# Patient Record
Sex: FEMALE | Race: WHITE | NOT HISPANIC OR LATINO | Employment: OTHER | URBAN - METROPOLITAN AREA
[De-identification: names, ages, dates, MRNs, and addresses within clinical notes are randomized per-mention and may not be internally consistent; named-entity substitution may affect disease eponyms.]

---

## 2017-01-17 ENCOUNTER — TELEPHONE (OUTPATIENT)
Dept: NEUROLOGY | Facility: MEDICAL CENTER | Age: 62
End: 2017-01-17

## 2017-01-17 DIAGNOSIS — R53.1 LEFT-SIDED WEAKNESS: ICD-10-CM

## 2017-01-17 DIAGNOSIS — G81.94 LEFT HEMIPARESIS (HCC): ICD-10-CM

## 2017-01-17 DIAGNOSIS — G40.909 CEREBRAL SEIZURE (HCC): ICD-10-CM

## 2017-01-17 NOTE — TELEPHONE ENCOUNTER
Pt's  was calling and needed clarification on his wife's medication. Spoke with pt's  and clarification was given. He is also asking for a referral for PT due to his wife's left sided weakness. [place] to see a PCP to get these referrals. Please advise. Thank you. VANDANA

## 2017-01-26 ENCOUNTER — TELEPHONE (OUTPATIENT)
Dept: NEUROLOGY | Facility: MEDICAL CENTER | Age: 62
End: 2017-01-26

## 2017-01-26 DIAGNOSIS — D32.9 MENINGIOMA (HCC): ICD-10-CM

## 2017-01-26 DIAGNOSIS — G40.909 SEIZURE CEREBRAL (HCC): ICD-10-CM

## 2017-01-26 RX ORDER — LAMOTRIGINE 100 MG/1
100 TABLET ORAL 3 TIMES DAILY
Qty: 90 TAB | Refills: 3 | Status: SHIPPED | OUTPATIENT
Start: 2017-01-26 | End: 2017-06-05 | Stop reason: SDUPTHER

## 2017-01-26 RX ORDER — BUTALBITAL, ACETAMINOPHEN AND CAFFEINE 300; 40; 50 MG/1; MG/1; MG/1
1 CAPSULE ORAL EVERY 4 HOURS PRN
Qty: 15 CAP | Refills: 0 | Status: SHIPPED | OUTPATIENT
Start: 2017-01-26 | End: 2017-02-21 | Stop reason: SDUPTHER

## 2017-01-26 NOTE — TELEPHONE ENCOUNTER
Pt's  is calling and stating that the pt is suffering from daily headaches. She is light sensitive and she is really dizzy with the headaches. She is not having any sound sensitivity or double vision. She has been taking Ibuprofen or Tylenol but they are no longer helping. She is calling to ask for a medication to help. She is taking Keppra 250 mg TID and Lamictal 100 mg BID. Please advise. Thank you. VANDANA

## 2017-01-26 NOTE — TELEPHONE ENCOUNTER
Up the Lamictal to 100mg TID   Fiorcet 15# maximum per month. YP      Called and spoke with pt's . All information/instructions were given. She verbally understood and will comply with all given. VANDANA

## 2017-02-09 ENCOUNTER — TELEPHONE (OUTPATIENT)
Dept: NEUROLOGY | Facility: MEDICAL CENTER | Age: 62
End: 2017-02-09

## 2017-02-09 NOTE — TELEPHONE ENCOUNTER
Pt's  is calling and had questions about the next time she will need to do another EEG. All questions and concerns were addressed. He also states since the increase in the Lamictal 100 mg TID she has not had to use the Fioricet as often. KA

## 2017-02-21 DIAGNOSIS — D32.9 MENINGIOMA (HCC): ICD-10-CM

## 2017-02-21 RX ORDER — BUTALBITAL, ACETAMINOPHEN AND CAFFEINE 300; 40; 50 MG/1; MG/1; MG/1
1 CAPSULE ORAL EVERY 4 HOURS PRN
Qty: 15 CAP | Refills: 0 | Status: SHIPPED | OUTPATIENT
Start: 2017-02-21 | End: 2018-01-10 | Stop reason: SDUPTHER

## 2017-02-21 NOTE — TELEPHONE ENCOUNTER
Pt's  is calling and asking for a refill for the Fioricet. Pt was able to tolerate the medication and it did help when she needed it. They are asking for a refill. Last filled 1/26/17 for # 15 with NO refills. Please advise. Thank you. VANDANA

## 2017-03-16 ENCOUNTER — OFFICE VISIT (OUTPATIENT)
Dept: NEUROLOGY | Facility: MEDICAL CENTER | Age: 62
End: 2017-03-16
Payer: COMMERCIAL

## 2017-03-16 VITALS
TEMPERATURE: 98.2 F | OXYGEN SATURATION: 98 % | BODY MASS INDEX: 22.88 KG/M2 | HEIGHT: 64 IN | RESPIRATION RATE: 16 BRPM | HEART RATE: 83 BPM | WEIGHT: 134 LBS | DIASTOLIC BLOOD PRESSURE: 56 MMHG | SYSTOLIC BLOOD PRESSURE: 100 MMHG

## 2017-03-16 DIAGNOSIS — D32.9 MENINGIOMA (HCC): ICD-10-CM

## 2017-03-16 DIAGNOSIS — E03.8 OTHER SPECIFIED HYPOTHYROIDISM: ICD-10-CM

## 2017-03-16 PROCEDURE — 99214 OFFICE O/P EST MOD 30 MIN: CPT | Performed by: PSYCHIATRY & NEUROLOGY

## 2017-03-16 NOTE — MR AVS SNAPSHOT
"        Nadia Srivastava   3/16/2017 10:40 AM   Office Visit   MRN: [id number]    Department:  [facility]   Dept Phone:  [phone]    Description:  Female : 1955   Provider:  Kenisha Martel M.D.           Reason for Visit     Follow-Up Meningioma      Allergies as of 3/16/2017     Allergen Noted Reactions    Norco [Hydrocodone-Acetaminophen] 10/16/2015   Rash, Itching    Pt stated she becomes itchy when taken    Pcn [Penicillins] 2015   Rash    As a child. Has not received pcn since.      You were diagnosed with     Meningioma (CMS-[facility])   [697048]       Other specified hypothyroidism   [0659125]         Vital Signs     Blood Pressure Pulse Temperature Respirations Height Weight    100/56 mmHg 83 36.8 °C (98.2 °F) 16 1.626 m (5' 4.02\") 60.782 kg (134 lb)    Body Mass Index Oxygen Saturation Smoking Status             22.99 kg/m2 98% Never Smoker          Basic Information     Date Of Birth Sex Race Ethnicity Preferred Language    1955 Female White Non- English      Your appointments     2017 10:20 AM   Follow Up Visit with Kenisha Maretl M.D.   [facility] (--)    [address], [address]  [facility] [address] [phone]           You will be receiving a confirmation call a few days before your appointment from our automated call confirmation system.              Problem List              ICD-10-CM Priority Class Noted - Resolved    Benign neoplasm of supratentorial region of brain (CMS-[facility]) D33.0 Medium  10/15/2015 - Present    Meningioma (CMS-[facility]) D32.9 High  10/15/2015 - Present    Vasospasm of cerebral artery I67.848 Medium  10/18/2015 - Present    Respiratory failure following trauma and surgery (CMS-[facility]) J95.822 Medium  10/18/2015 - Present    GERD (gastroesophageal reflux disease) K21.9 Low  10/18/2015 - Present    Hypothyroidism E03.9 Low  10/18/2015 - Present    Hyperlipidemia E78.5 Low  10/18/2015 - Present    Inadequate anticoagulation " Z51.81, Z79.01 Medium  10/18/2015 - Present    Hyperglycemia R73.9 Medium  10/22/2015 - Present    Fever R50.9 Medium  10/23/2015 - Present    Debility R53.81 Medium  10/29/2015 - Present    Hyponatremia E87.1 Medium  10/29/2015 - Present    Dysphagia R13.10 High  10/31/2015 - Present    Elevated liver enzymes R74.8 Medium  11/1/2015 - Present    Left hemiparesis (CMS-HCC) G81.94 High Acute 11/3/2015 - Present    CVA, old, dysphagia I69.391   11/11/2015 - Present    Diabetes type 2, controlled (CMS-HCC) E11.9   11/18/2015 - Present    Hypokalemia E87.6   11/18/2015 - Present    Azotemia R79.89   11/18/2015 - Present    Seizure cerebral I67.89   8/11/2016 - Present      Health Maintenance        Date Due Completion Dates    DIABETES MONOFILAMENT / LE EXAM 1955 ---    RETINAL SCREENING 2/15/1971 ---    FASTING LIPID PROFILE 2/15/1971 ---    URINE ACR / MICROALBUMIN 2/15/1971 ---    IMM DTaP/Tdap/Td Vaccine (1 - Tdap) 2/15/1972 ---    IMM PNEUMOCOCCAL 19-64 (ADULT) MEDIUM RISK SERIES (1 of 1 - PPSV23) 2/15/1972 ---    PAP SMEAR 2/15/1974 ---    MAMMOGRAM 2/15/1993 ---    COLONOSCOPY 2/15/2003 ---    IMM ZOSTER VACCINE 2/15/2013 ---    A1C SCREENING 5/5/2016 11/5/2015    IMM INFLUENZA (1) 9/1/2016 12/18/2015    SERUM CREATININE 12/21/2016 12/21/2015, 12/20/2015, 12/14/2015, 12/13/2015, 12/11/2015, 12/10/2015, 12/5/2015, 11/30/2015, 11/29/2015, 11/26/2015, 11/22/2015, 11/19/2015, 11/16/2015, 11/13/2015, 11/10/2015, 11/7/2015, 11/6/2015, 11/5/2015, 11/3/2015, 11/1/2015, 10/31/2015, 10/30/2015, 10/29/2015, 10/28/2015, 10/27/2015, 10/26/2015, 10/25/2015, 10/24/2015, 10/23/2015, 10/23/2015, 10/23/2015, 10/22/2015, 10/22/2015, 10/22/2015, 10/22/2015, 10/22/2015, 10/21/2015, 10/21/2015, 10/21/2015, 10/21/2015, 10/20/2015, 10/20/2015, 10/20/2015, 10/20/2015, 10/20/2015, 10/19/2015, 10/19/2015, 10/19/2015, 10/18/2015, 10/18/2015, 10/18/2015, 10/18/2015, 10/17/2015, 10/17/2015, 10/17/2015, 10/17/2015, 9/6/2015, 9/5/2015               Current Immunizations     Influenza Vaccine Quad Inj (Pf) 12/18/2015  4:00 PM      Below and/or attached are the medications your provider expects you to take. Review all of your home medications and newly ordered medications with your provider and/or pharmacist. Follow medication instructions as directed by your provider and/or pharmacist. Please keep your medication list with you and share with your provider. Update the information when medications are discontinued, doses are changed, or new medications (including over-the-counter products) are added; and carry medication information at all times in the event of emergency situations     Allergies:  NORCO - Rash,Itching     PCN - Rash               Medications  Valid as of: March 16, 2017 - 11:22 AM    Generic Name Brand Name Tablet Size Instructions for use    Acetaminophen (Tab) Acetaminophen 650 MG Take 650 mg by mouth every 6 hours as needed (for temp over 101 F or mild/moderate pain).        Alum Hydroxide-Mag Trisilicate (Chew Tab) Alum Hydroxide-Mag Trisilicate 80-14.2 MG Take  by mouth.        Butalbital-APAP-Caffeine (Cap) FIORICET -40 MG Take 1 Cap by mouth every four hours as needed for Headache.        Calcium Carbonate Antacid (Chew Tab) Calcium Carbonate Antacid 1000 MG Take 1 Tab by mouth 3 times a day as needed (heartburn).        Cholecalciferol (Cap) Cholecalciferol 2000 UNIT Take 2,000 Units by mouth every day.        Cholecalciferol (Cap) Vitamin D 2000 UNITS Take  by mouth.        Cyanocobalamin (Tab) cyanocobalamin 100 MCG Take 1 Tab by mouth every day.        Diphenhydramine-Zinc Acetate (Cream) BENADRYL ITCH 1-0.1 % Apply to neck and chest 2 times daily as needed for itching        Estradiol (Tab) ESTRACE 1 MG Take 1 Tab by mouth every day.        Ferrous Sulfate (Tab) ferrous sulfate 325 (65 FE) MG Take 1 Tab by mouth 2 times a day, with meals.        LamoTRIgine (Tab) LAMICTAL 100 MG Take 1 Tab by mouth 3 times a day.         LevETIRAcetam (Tab) KEPPRA 250 MG Take 1 Tab by mouth 3 times a day.        Levothyroxine Sodium (Tab) SYNTHROID 100 MCG Take 1 Tab by mouth Every morning on an empty stomach.        Levothyroxine Sodium (Tab) SYNTHROID 88 MCG Take 88 mcg by mouth Every morning on an empty stomach.        Lutein (Cap) Lutein 20 MG Take  by mouth.        Magnesium Hydroxide (Suspension) MILK OF MAGNESIA 400 MG/5ML Take 30 mL by mouth 1 time daily as needed (if no BM).        Magnesium Oxide (Tab) MAG- (241.3 MG) MG Take 1 Tab by mouth every day.        Norethindrone Acetate (Tab) AYGESTIN 5 MG Take 1 Tab by mouth every day.        Omeprazole (CAPSULE DELAYED RELEASE) PRILOSEC 40 MG Take 1 Cap by mouth every day.        Omeprazole (CAPSULE DELAYED RELEASE) PRILOSEC 20 MG Take 20 mg by mouth every day.        Phenyleph-Shark Beatrice Oil-MO-Pet (Ointment) FORMULATION R 0.25-3-14-71.9 % Insert 1 Application in rectum at bedtime as needed (pruritis/pain). Equivalent to Preparation H Ointment        Simvastatin (Tab) ZOCOR 40 MG Take 40 mg by mouth every evening.        Sodium Phosphates (Enema) fleet 7-19 GM/118ML Insert 133 mL in rectum 1 time daily as needed (if bisacodyl ineffective after one day or if dulcolax is not ordered).        Venlafaxine HCl (Tab) EFFEXOR 75 MG Take 75 mg by mouth 3 times a day.        Vitamin E (Cap) VITAMIN E 400 UNIT Take 1 Cap by mouth every day.        .                 Medicines prescribed today were sent to:     [address] [address]    Phone: [phone] Fax: [phone]    Open 24 Hours?: No      Medication refill instructions:       If your prescription bottle indicates you have medication refills left, it is not necessary to call your provider’s office. Please contact your pharmacy and they will refill your medication.    If your prescription bottle indicates you do not have any refills left, you may request refills at any time  through one of the following ways: The online [employer] system (except Urgent Care), by calling your provider’s office, or by asking your pharmacy to contact your provider’s office with a refill request. Medication refills are processed only during regular business hours and may not be available until the next business day. Your provider may request additional information or to have a follow-up visit with you prior to refilling your medication.   *Please Note: Medication refills are assigned a new Rx number when refilled electronically. Your pharmacy may indicate that no refills were authorized even though a new prescription for the same medication is available at the pharmacy. Please request the medicine by name with the pharmacy before contacting your provider for a refill.        Referral     A referral request has been sent to our patient care coordination department. Please allow 3-5 business days for us to process this request and contact you either by phone or mail. If you do not hear from us by the 5th business day, please call us at [phone].        Instructions      IMPRESSION:    1. 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 Dizziness spells since March 2016 (Keppra was tapered off this Feb)-- seizures are most likely ( abnormal EEG)--- Dizziness subsided after keppra initially but came back recently  3. Hx of hypothyroidism  4. Seizure and poor memory 2015  5. Spells of hot -cold feelings, spells of GI upset  6. Falls, Bad headache, Bad dizziness    PLAN/RECOMMENDATIONS:      5 days of video EEG-- we will not need sleep deprivation  Advise cut down on pain medicating    Advise cut down on pain medicine-- including tylenol-- the less rescue medicine, the less likely to develop rebound headache  The goal is to limit all rescue medicine to less than 2# per week. The first 2 weeks of medicine withdrawal would be  tough    ________________________________________________________________________    These dizziness spells are part of seizure spectrum-- not all seizures would shake or passing out     The recent EEG-- right side brain has active brain malfunction still      Call us in one month--- to discuss about the side effects and possible modification of seizure medication    The current problem is Nadia has poor tolerance of keppra--higher dose than 250mg TID made her quite sleepy    Continue keppra  With Lamictal 200mg AM 100mg HS-- patient has more headache and dizziness  Will reduce lamictal to 100mg two times per day  In one month, will reduce lamictal to 100mg AM--stay at 100mg AM    We could even consider 5 days inpatient video EEG to adjust seizure medication in hospital if we could not find the best combination of seizure medication in outpatient  ________________________________________________________________________      ________________________________________________________________________      FOOD    Foods that combat inflammation     Include plenty of these anti-inflammatory foods in your diet:     tomatoes     olive oil     green leafy vegetables, such as spinach, kale, and collards     nuts like almonds and walnuts     fatty fish like salmon, mackerel, tuna, and sardines     fruits such as strawberries, blueberries, cherries, and oranges      Foods that inflame     Try to avoid or limit these foods as much as possible:     refined carbohydrates, such as white bread and pastries     French fries and other fried foods     soda and other sugar-sweetened beverages     red meat (burgers, steaks) and processed meat (hot dogs, sausage)     margarine, shortening, and lard      [email or web address]    ________________________________________________________________________          SIGNATURE:  Kenisha Martel    CC:  CURRY RodriguezNShilpaP.    MRI of brain  before and after surgery      INTERPRETATION:  This EEG denotes focal cortical dysfunction and active focal seizure   disorder coming from the right hemisphere probabley temporal lobe.       ____________________________________     MD CONSTANZA WHITMORE / MELLISSA    DD:  10/13/2016 08:20:01  DT:  10/13/2016 09:45:59    The following are fine with me  ________________________________________________________________________    Fish Oil -- Omega 3 1000mg 3# daily  Daily Probiotic too  Vit D-3 2000 unit daily or above  ________________________________________________________________________      D#:  [id number]  Job#:  [id number]      EEG showed active epileptiform discharges from right hemisphere                Webcom Access Code: [email or web address]  Expires: 4/15/2017  9:55 AM    Webcom  A secure, online tool to manage your health information     [employer] Webcom® is a secure, online tool that connects you to your personalized health information from the privacy of your home -- day or night - making it very easy for you to manage your healthcare. Once the activation process is completed, you can even access your medical information using the Webcom kerry, which is available for free in the [employer] Kerry store or Google Play store.     Webcom provides the following levels of access (as shown below):   My Chart Features   [facility] Doctor [facility]  Specialists [facility]  Urgent  Care Non-[facility]  Primary Care  Doctor   Email your healthcare team securely and privately 24/7 X X X    Manage appointments: schedule your next appointment; view details of past/upcoming appointments X      Request prescription refills. X      View recent personal medical records, including lab and immunizations X X X X   View health record, including health history, allergies, medications X X X X   Read reports about your outpatient visits, procedures, consult and ER notes X X X X   See your discharge summary, which is a recap of your hospital  and/or ER visit that includes your diagnosis, lab results, and care plan. X X       How to register for [employer]:  1. Go to  [email or web address].  2. Click on the Sign Up Now box, which takes you to the New Member Sign Up page. You will need to provide the following information:  a. Enter your Font[employer] Access Code exactly as it appears at the top of this page. (You will not need to use this code after you’ve completed the sign-up process. If you do not sign up before the expiration date, you must request a new code.)   b. Enter your date of birth.   c. Enter your home email address.   d. Click Submit, and follow the next screen’s instructions.  3. Create a [employer] ID. This will be your Fontacto login ID and cannot be changed, so think of one that is secure and easy to remember.  4. Create a [employer] password. You can change your password at any time.  5. Enter your Password Reset Question and Answer. This can be used at a later time if you forget your password.   6. Enter your e-mail address. This allows you to receive e-mail notifications when new information is available in Fontacto.  7. Click Sign Up. You can now view your health information.    For assistance activating your Font[employer] account, call [phone]

## 2017-03-16 NOTE — PROGRESS NOTES
NEUROLOGY NOTE    Referring Physician  Brittney Wells      CHIEF COMPLAINT:    Dizziness spells went away with keppra 250mg tid but came back weeks ago  but patient remained sleepy now  The EEG done just days ago-- active seizure focus from the right still  Left hand muscle strength improved with keppra  July 2015 started having seizures, found to have brain tumor-- status post surgery oct 2015-- she ended in hospital for 2+ months  keppra was stopped this Feb, she started having dizziness spells since March  She then had extensive work up in ENT doctor  Chief Complaint   Patient presents with   • Follow-Up     Meningioma       PRESENT ILLNESS:   Dizziness spells went away with keppra 250mg tid but came back weeks ago  but patient remained sleepy now  Dizziness spells went away with keppra but patient remained sleepy now  The EEG done just days ago-- active seizure focus from the right still  Left hand muscle strength improved with keppra  July 2015 started having seizures, found to have brain tumor-- status post surgery oct 2015-- she ended in hospital for 2+ months  keppra was stopped this Feb, she started having dizziness spells since March  She then had extensive work up in ENT doctor    She could walk with cane-- today sitting on the wheelchair    Right eye retina -- injuried        PAST MEDICAL HISTORY:  Past Medical History   Diagnosis Date   • Seizure      last 9/4/15   • Memory difficulties      r/t tumor   • Dental disorder      grinds teeth, uses mouth guard at night   • High cholesterol    • Disorder of thyroid    • Cataract      Right eye removed. Has cataract on left eye   • Myopia of both eyes    • Bronchitis      hx    • Pneumonia 2006     pneumonia and whopping cough    • Diabetes      was on oral meds, stopped because FS are low   • Hiatus hernia syndrome    • Bell's palsy      Hx   • Anesthesia 2010     difficulty swallowing post neck surgery   • Snoring    • Stroke    • Urinary incontinence         PAST SURGICAL HISTORY:  Past Surgical History   Procedure Laterality Date   • Other  2014     Right eye cataract removel    • Livia by laparoscopy  2014   • Other neurological surg       fusion c spine   • Gyn surgery       ablation   • Colonoscopy     • Other orthopedic surgery Left 01/2015     rotator cuff    • Recovery  10/15/2015     Procedure: IR2-Cerebral angiogram w/poss embolization  -;  Surgeon: [facility];  Location: SURGERY [facility];  Service:    • Craniotomy tumor Right 10/16/2015     Procedure: CRANIOTOMY TUMOR for meningioma;  Surgeon: Carlton Faria M.D.;  Location: SURGERY [facility];  Service:    • Gastroscopy-endo  11/11/2015     Procedure: GASTROSCOPY-ENDO;  Surgeon: Charles Khan M.D.;  Location: ENDOSCOPY [facility];  Service:    • Peg placement  11/11/2015     Procedure: PEG PLACEMENT;  Surgeon: Charles Khan M.D.;  Location: ENDOSCOPY [facility];  Service:        FAMILY HISTORY:  No family history on file.    SOCIAL HISTORY:  Social History     Social History   • Marital Status:      Spouse Name: N/A   • Number of Children: N/A   • Years of Education: N/A     Occupational History   • Not on file.     Social History Main Topics   • Smoking status: Never Smoker    • Smokeless tobacco: Never Used   • Alcohol Use: No   • Drug Use: No   • Sexual Activity: Not on file     Other Topics Concern   • Not on file     Social History Narrative     ALLERGIES:  Allergies   Allergen Reactions   • Norco [Hydrocodone-Acetaminophen] Rash and Itching     Pt stated she becomes itchy when taken   • Pcn [Penicillins] Rash     As a child. Has not received pcn since.     TOBHX  History   Smoking status   • Never Smoker    Smokeless tobacco   • Never Used     ALCHX  History   Alcohol Use No     DRUGHX  History   Drug Use No           MEDICATIONS:  Current Outpatient Prescriptions   Medication   • acetaminophen/caffeine/butalbital 300-40-50 mg  (FIORICET) -40 MG Cap capsule   • lamotrigine (LAMICTAL) 100 MG Tab   • lamotrigine (LAMICTAL) 25 MG Tab   • levetiracetam (KEPPRA) 250 MG tablet   • levothyroxine (SYNTHROID) 88 MCG Tab   • simvastatin (ZOCOR) 40 MG Tab   • venlafaxine (EFFEXOR) 75 MG Tab   • omeprazole (PRILOSEC) 20 MG delayed-release capsule   • Cholecalciferol (VITAMIN D) 2000 UNITS Cap   • Lutein 20 MG Cap   • Alum Hydroxide-Mag Trisilicate 80-14.2 MG Chew Tab   • Acetaminophen 650 MG Tab   • FORMULATION R 0.25-3-14-71.9 % Ointment   • calcium carbonate 1000 MG Chew Tab   • diphenhydramine-ZnAcetate (BENADRYL ITCH) 1-0.1 % Cream   • estradiol (ESTRACE) 1 MG Tab   • cyanocobalamin 100 MCG tablet   • ferrous sulfate 325 (65 FE) MG tablet   • magnesium hydroxide (MILK OF MAGNESIA) 400 MG/5ML Suspension   • magnesium oxide (MAG-OX) 400 (241.3 MG) MG Tab tablet   • norethindrone (AYGESTIN) 5 MG tablet   • levothyroxine (SYNTHROID) 100 MCG Tab   • omeprazole (PRILOSEC) 40 MG delayed-release capsule   • vitamin e (VITAMIN E) 400 UNIT Cap   • Sodium Phosphates (FLEET) 7-19 GM/118ML Enema   • Cholecalciferol 2000 UNIT Cap     No current facility-administered medications for this visit.       REVIEW OF SYSTEM:    Constitutional: Denies fevers, Denies weight changes   Eyes: right eye retina bleeding  Ears/Nose/Throat/Mouth: Denies nasal congestion or sore throat   Cardiovascular: Denies chest pain or palpitations   Respiratory: Denies SOB.   Gastrointestinal/Hepatic: Denies abdominal pain, nausea, vomiting, diarrhea, constipation or GI bleeding   Genitourinary: Denies bladder dysfunction, dysuria or frequency   Musculoskeletal/Rheum: Denies joint pain and swelling   Skin/Breast: Denies rash, denies breast lumps or discharge   Neurological: meningioma, seizure, dizziness  Psychiatric: denies mood disorder   Endocrine: denies hx of diabetes or thyroid dysfunction   Heme/Oncology/Lymph Nodes: Denies enlarged lymph nodes, denies brusing or known  bleeding disorder   Allergic/Immunologic: Denies hx of allergies         PHYSICAL AND NEUROLOGICAL EXMAINATIONS:  VITAL SIGNS: There were no vitals taken for this visit.  CURRENT WEIGHT:   BMI: There is no weight on file to calculate BMI.  PREVIOUS WEIGHTS:  Wt Readings from Last 25 Encounters:   12/15/16 61.236 kg (135 lb)   12/14/16 58.968 kg (130 lb)   08/11/16 63.504 kg (140 lb)   12/20/15 63.821 kg (140 lb 11.2 oz)   11/09/15 62.551 kg (137 lb 14.4 oz)   11/01/15 76.3 kg (168 lb 3.4 oz)   09/04/15 69.854 kg (154 lb)       General appearance of patient: WDWN(+) NAD(+)    EYES  o Fundus : Papilledem(-) Exudates(-) Hemorrhage(-)  Nervous System  Orientation to time, place and person(+)  Memory normal(-) recall 3/3  Language: aphasia(-)  Knowledge: past(+) Current(+)  Attention(+)  Right eye blindess  Cranial Nerves  • Nerve 2: intact  • Nerve 3,4,6: intact  • Nerve 5 : intact  • Nerve 7: intact  • Nerve 8: intact  • Nerve 9 & 10: intact  • Nerve 11: intact  • Nerve 12: intact  Muscle Power and muscle tone: mild left hemiparesis  Sensory System: Pin sensation intact(+)  Reflexes: symmetric increased over left limbs  Cerebellar Function FNP normal   Gait : general weakness-- still able to walk with cane  Heart and Vascular  Peripheral Vasucular system : Edema (-) Swelling(-)  RHB, Breathing sound clear  abdomen bowel sound normoactive  Extremities freely moveable  Joints no contracture       NEUROIMAGING: I reviewed the MRI/CT of brain       Able to walk with cane  Tendency  Bad headache      IMPRESSION:    1. 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 Dizziness spells since March 2016 (Keppra was tapered off this Feb)-- seizures are most likely ( abnormal EEG)--- Dizziness subsided after keppra initially but came back recently  3. Hx of hypothyroidism  4. Seizure and poor memory 2015  5. Spells of hot -cold feelings, spells of GI upset  6.  Falls, Bad headache, Bad dizziness    PLAN/RECOMMENDATIONS:      5 days of video EEG-- we will not need sleep deprivation  Advise cut down on pain medicating    Advise cut down on pain medicine-- including tylenol-- the less rescue medicine, the less likely to develop rebound headache  The goal is to limit all rescue medicine to less than 2# per week. The first 2 weeks of medicine withdrawal would be tough    ________________________________________________________________________    These dizziness spells are part of seizure spectrum-- not all seizures would shake or passing out     The recent EEG-- right side brain has active brain malfunction still      Call us in one month--- to discuss about the side effects and possible modification of seizure medication    The current problem is Nadia has poor tolerance of keppra--higher dose than 250mg TID made her quite sleepy    Continue keppra  With Lamictal 200mg AM 100mg HS-- patient has more headache and dizziness  Will reduce lamictal to 100mg two times per day  In one month, will reduce lamictal to 100mg AM--stay at 100mg AM    We could even consider 5 days inpatient video EEG to adjust seizure medication in hospital if we could not find the best combination of seizure medication in outpatient  ________________________________________________________________________      ________________________________________________________________________      FOOD    Foods that combat inflammation     Include plenty of these anti-inflammatory foods in your diet:     tomatoes     olive oil     green leafy vegetables, such as spinach, kale, and collards     nuts like almonds and walnuts     fatty fish like salmon, mackerel, tuna, and sardines     fruits such as strawberries, blueberries, cherries, and oranges      Foods that inflame     Try to avoid or limit these foods as much as possible:     refined carbohydrates, such as white bread and pastries     French fries and other  fried foods     soda and other sugar-sweetened beverages     red meat (burgers, steaks) and processed meat (hot dogs, sausage)     margarine, shortening, and lard      [email or web address]/staying-[email or web address]/foods-that-fight-inflammation    ________________________________________________________________________          SIGNATURE:  Kenisha Martel    CC:  JIM Rodriguez    MRI of brain before and after surgery      INTERPRETATION:  This EEG denotes focal cortical dysfunction and active focal seizure   disorder coming from the right hemisphere probabley temporal lobe.       ____________________________________     MD CONSTANZA WHITMORE / MELLISSA    DD:  10/13/2016 08:20:01  DT:  10/13/2016 09:45:59    The following are fine with me  ________________________________________________________________________    Fish Oil -- Omega 3 1000mg 3# daily  Daily Probiotic too  Vit D-3 2000 unit daily or above  ________________________________________________________________________      D#:  [id number]  Job#:  [id number]      EEG showed active epileptiform discharges from right hemisphere

## 2017-03-16 NOTE — PATIENT INSTRUCTIONS
IMPRESSION:    1. 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 Dizziness spells since March 2016 (Keppra was tapered off this Feb)-- seizures are most likely ( abnormal EEG)--- Dizziness subsided after keppra initially but came back recently  3. Hx of hypothyroidism  4. Seizure and poor memory 2015  5. Spells of hot -cold feelings, spells of GI upset  6. Falls, Bad headache, Bad dizziness    PLAN/RECOMMENDATIONS:      5 days of video EEG-- we will not need sleep deprivation  Advise cut down on pain medicating    Advise cut down on pain medicine-- including tylenol-- the less rescue medicine, the less likely to develop rebound headache  The goal is to limit all rescue medicine to less than 2# per week. The first 2 weeks of medicine withdrawal would be tough    ________________________________________________________________________    These dizziness spells are part of seizure spectrum-- not all seizures would shake or passing out     The recent EEG-- right side brain has active brain malfunction still      Call us in one month--- to discuss about the side effects and possible modification of seizure medication    The current problem is Nadia has poor tolerance of keppra--higher dose than 250mg TID made her quite sleepy    Continue keppra  With Lamictal 200mg AM 100mg HS-- patient has more headache and dizziness  Will reduce lamictal to 100mg two times per day  In one month, will reduce lamictal to 100mg AM--stay at 100mg AM    We could even consider 5 days inpatient video EEG to adjust seizure medication in hospital if we could not find the best combination of seizure medication in outpatient  ________________________________________________________________________      ________________________________________________________________________      FOOD    Foods that combat inflammation     Include plenty of these anti-inflammatory foods in  your diet:     tomatoes     olive oil     green leafy vegetables, such as spinach, kale, and collards     nuts like almonds and walnuts     fatty fish like salmon, mackerel, tuna, and sardines     fruits such as strawberries, blueberries, cherries, and oranges      Foods that inflame     Try to avoid or limit these foods as much as possible:     refined carbohydrates, such as white bread and pastries     French fries and other fried foods     soda and other sugar-sweetened beverages     red meat (burgers, steaks) and processed meat (hot dogs, sausage)     margarine, shortening, and lard      [email or web address]/staying-healthy/foods-that-fight-inflammation    ________________________________________________________________________          SIGNATURE:  Kenisha Martel    CC:  JIM Rodriguez    MRI of brain before and after surgery      INTERPRETATION:  This EEG denotes focal cortical dysfunction and active focal seizure   disorder coming from the right hemisphere probabley temporal lobe.       ____________________________________     MD CONSTANZA WHITMORE / MELLISSA    DD:  10/13/2016 08:20:01  DT:  10/13/2016 09:45:59    The following are fine with me  ________________________________________________________________________    Fish Oil -- Omega 3 1000mg 3# daily  Daily Probiotic too  Vit D-3 2000 unit daily or above  ________________________________________________________________________      D#:  [id number]  Job#:  [id number]      EEG showed active epileptiform discharges from right hemisphere

## 2017-03-31 ENCOUNTER — TELEPHONE (OUTPATIENT)
Dept: NEUROLOGY | Facility: MEDICAL CENTER | Age: 62
End: 2017-03-31

## 2017-03-31 NOTE — TELEPHONE ENCOUNTER
YES, of course-- try Lamictal go back to 100 mg  mg HS   And not change of Lamictal till next visit       LAST TIME-- the caregivers told me that   ________________________________________________________________________     With Lamictal 200 mg  mg HS-- patient has more headache and dizziness   Will reduce Lamictal to 100 mg two times per day and ..... YP  ----------------------------------------------------------------------------------------------------------------------------------      Called and spoke with pt's . All information/instructions were given. He verbally understood and will comply with all given. VANDANA

## 2017-03-31 NOTE — TELEPHONE ENCOUNTER
Pt's  is calling and stating since 3/16/17 when the Lamictal was decreased to 100 mg BID from 200 mg in the am and 100 in the pm she is having to use the Fioricet more often. He is calling and asking should she go back up on the dose of the Lamictal. Please advise. Thank you. VANDANA

## 2017-06-05 DIAGNOSIS — G40.909 SEIZURE CEREBRAL (HCC): ICD-10-CM

## 2017-06-07 RX ORDER — LAMOTRIGINE 100 MG/1
TABLET ORAL
Qty: 90 TAB | Refills: 5 | Status: SHIPPED | OUTPATIENT
Start: 2017-06-07 | End: 2017-11-02 | Stop reason: SDUPTHER

## 2017-07-18 ENCOUNTER — OFFICE VISIT (OUTPATIENT)
Dept: NEUROLOGY | Facility: MEDICAL CENTER | Age: 62
End: 2017-07-18
Payer: COMMERCIAL

## 2017-07-18 VITALS
WEIGHT: 143.2 LBS | BODY MASS INDEX: 24.45 KG/M2 | OXYGEN SATURATION: 97 % | HEIGHT: 64 IN | TEMPERATURE: 97.6 F | DIASTOLIC BLOOD PRESSURE: 76 MMHG | HEART RATE: 77 BPM | SYSTOLIC BLOOD PRESSURE: 122 MMHG | RESPIRATION RATE: 17 BRPM

## 2017-07-18 DIAGNOSIS — G40.909 SEIZURE CEREBRAL (HCC): ICD-10-CM

## 2017-07-18 DIAGNOSIS — D32.9 MENINGIOMA (HCC): ICD-10-CM

## 2017-07-18 PROCEDURE — 99214 OFFICE O/P EST MOD 30 MIN: CPT | Performed by: PSYCHIATRY & NEUROLOGY

## 2017-07-18 RX ORDER — TOPIRAMATE 50 MG/1
50 TABLET, FILM COATED ORAL 2 TIMES DAILY
Qty: 60 TAB | Refills: 3 | Status: SHIPPED | OUTPATIENT
Start: 2017-07-18 | End: 2017-08-03

## 2017-07-18 ASSESSMENT — PATIENT HEALTH QUESTIONNAIRE - PHQ9
5. POOR APPETITE OR OVEREATING: 0 - NOT AT ALL
CLINICAL INTERPRETATION OF PHQ2 SCORE: 1
SUM OF ALL RESPONSES TO PHQ QUESTIONS 1-9: 10

## 2017-07-18 ASSESSMENT — PAIN SCALES - GENERAL: PAINLEVEL: 8=MODERATE-SEVERE PAIN

## 2017-07-18 NOTE — PATIENT INSTRUCTIONS
IMPRESSION:    1. 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 Dizziness spells since March 2016 (Keppra was tapered off this Feb)-- seizures are most likely ( abnormal EEG)--- Dizziness subsided after keppra initially but came back recently  3. Hx of hypothyroidism  4. Seizure and poor memory 2015  5. Spells of hot -cold feelings, spells of GI upset  6. Falls, Bad headache, Bad dizziness    PLAN/RECOMMENDATIONS:      The major concerns--- headache daily, no energy, tiredness, hallucinations    Advise cut down on pain medicine-- including tylenol-- the less rescue medicine, the less likely to develop rebound headache  The goal is to limit all rescue medicine to less than 2# per week. The first 2 weeks of medicine withdrawal would be tough    ________________________________________________________________________    These dizziness spells are part of seizure spectrum-- not all seizures would shake or passing out     The recent EEG-- right side brain has active brain malfunction still      Nadia has poor tolerance of keppra--higher dose than 250mg TID made her quite sleepy    Continue keppra     With Lamictal 200mg AM 100mg HS-- patient has more headache and dizziness ( reduction of Lamictal made things even worse)    This time, we will add Topamax 50mg before sleep for one week, then up Topamax to 50mg two times per day since the second week      We could even consider 5 days inpatient video EEG to adjust seizure medication in hospital if we could not find the best combination of seizure medication in outpatient  ________________________________________________________________________      ________________________________________________________________________    Fish Oil -- Omega 3 1000mg 3#-6# daily ( Flaxseed oil is fine too)  Try Daily Probiotic too  Vit D-3 4000 unit  daily  ________________________________________________________________________        ________________________________________________________________________      FOOD    Foods that combat inflammation     Include plenty of these anti-inflammatory foods in your diet:     tomatoes     olive oil     green leafy vegetables, such as spinach, kale, and collards     nuts like almonds and walnuts     fatty fish like salmon, mackerel, tuna, and sardines     fruits such as strawberries, blueberries, cherries, and oranges      Foods that inflame     Try to avoid or limit these foods as much as possible:     refined carbohydrates, such as white bread and pastries     French fries and other fried foods     soda and other sugar-sweetened beverages     red meat (burgers, steaks) and processed meat (hot dogs, sausage)     margarine, shortening, and lard      [email or web address]/staying-[email or web address]/foods-that-fight-inflammation    ________________________________________________________________________

## 2017-07-18 NOTE — PROGRESS NOTES
NEUROLOGY NOTE    Referring Physician  Brittney Wells      CHIEF COMPLAINT:    Dizziness spells went away with keppra 250mg tid but came back weeks ago  but patient remained sleepy now  The EEG done just days ago-- active seizure focus from the right still  Left hand muscle strength improved with keppra  July 2015 started having seizures, found to have brain tumor-- status post surgery oct 2015-- she ended in hospital for 2+ months  keppra was stopped this Feb, she started having dizziness spells since March  She then had extensive work up in ENT doctor  Chief Complaint   Patient presents with   • Follow-Up     4m [facility]       PRESENT ILLNESS:   Dizziness spells went away with keppra 250mg tid but came back weeks ago  but patient remained sleepy now  Dizziness spells went away with keppra but patient remained sleepy now  The EEG done just days ago-- active seizure focus from the right still  Left hand muscle strength improved with keppra  July 2015 started having seizures, found to have brain tumor-- status post surgery oct 2015-- she ended in hospital for 2+ months  keppra was stopped this Feb, she started having dizziness spells since March  She then had extensive work up in ENT doctor    She could walk with cane-- today sitting on the wheelchair    Right eye retina -- injuried        PAST MEDICAL HISTORY:  Past Medical History   Diagnosis Date   • Seizure      last 9/4/15   • Memory difficulties      r/t tumor   • Dental disorder      grinds teeth, uses mouth guard at night   • High cholesterol    • Disorder of thyroid    • Cataract      Right eye removed. Has cataract on left eye   • Myopia of both eyes    • Bronchitis      hx    • Pneumonia 2006     pneumonia and whopping cough    • Diabetes      was on oral meds, stopped because FS are low   • Hiatus hernia syndrome    • Bell's palsy      Hx   • Anesthesia 2010     difficulty swallowing post neck surgery   • Snoring    • Stroke    • Urinary incontinence             PAST SURGICAL HISTORY:  Past Surgical History   Procedure Laterality Date   • Other  2014     Right eye cataract removel    • Livia by laparoscopy  2014   • Other neurological surg       fusion c spine   • Gyn surgery       ablation   • Colonoscopy     • Other orthopedic surgery Left 01/2015     rotator cuff    • Recovery  10/15/2015     Procedure: IR2-Cerebral angiogram w/poss embolization  ;  Surgeon: [facility];  Location: SURGERY [facility];  Service:    • Craniotomy tumor Right 10/16/2015     Procedure: CRANIOTOMY TUMOR for meningioma;  Surgeon: Carlton Faria M.D.;  Location: SURGERY [facility];  Service:    • Gastroscopy-endo  11/11/2015     Procedure: GASTROSCOPY-ENDO;  Surgeon: Charles Khan M.D.;  Location: ENDOSCOPY [facility];  Service:    • Peg placement  11/11/2015     Procedure: PEG PLACEMENT;  Surgeon: Charles Khan M.D.;  Location: ENDOSCOPY [facility];  Service:        FAMILY HISTORY:  No family history on file.    SOCIAL HISTORY:  Social History     Social History   • Marital Status:      Spouse Name: N/A   • Number of Children: N/A   • Years of Education: N/A     Occupational History   • Not on file.     Social History Main Topics   • Smoking status: Never Smoker    • Smokeless tobacco: Never Used   • Alcohol Use: No   • Drug Use: No   • Sexual Activity: Not on file     Other Topics Concern   • Not on file     Social History Narrative     ALLERGIES:  Allergies   Allergen Reactions   • Norco [Hydrocodone-Acetaminophen] Rash and Itching     Pt stated she becomes itchy when taken   • Pcn [Penicillins] Rash     As a child. Has not received pcn since.     TOBHX  History   Smoking status   • Never Smoker    Smokeless tobacco   • Never Used     ALCHX  History   Alcohol Use No     DRUGHX  History   Drug Use No           MEDICATIONS:  Current Outpatient Prescriptions   Medication   • lamotrigine (LAMICTAL) 100 MG Tab   •  acetaminophen/caffeine/butalbital 300-40-50 mg (FIORICET) -40 MG Cap capsule   • levetiracetam (KEPPRA) 250 MG tablet   • levothyroxine (SYNTHROID) 88 MCG Tab   • simvastatin (ZOCOR) 40 MG Tab   • venlafaxine (EFFEXOR) 75 MG Tab   • omeprazole (PRILOSEC) 20 MG delayed-release capsule   • Cholecalciferol (VITAMIN D) 2000 UNITS Cap   • Lutein 20 MG Cap   • Alum Hydroxide-Mag Trisilicate 80-14.2 MG Chew Tab   • Acetaminophen 650 MG Tab   • FORMULATION R 0.25-3-14-71.9 % Ointment   • calcium carbonate 1000 MG Chew Tab   • diphenhydramine-ZnAcetate (BENADRYL ITCH) 1-0.1 % Cream   • estradiol (ESTRACE) 1 MG Tab   • cyanocobalamin 100 MCG tablet   • ferrous sulfate 325 (65 FE) MG tablet   • magnesium hydroxide (MILK OF MAGNESIA) 400 MG/5ML Suspension   • magnesium oxide (MAG-OX) 400 (241.3 MG) MG Tab tablet   • norethindrone (AYGESTIN) 5 MG tablet   • levothyroxine (SYNTHROID) 100 MCG Tab   • omeprazole (PRILOSEC) 40 MG delayed-release capsule   • vitamin e (VITAMIN E) 400 UNIT Cap   • Sodium Phosphates (FLEET) 7-19 GM/118ML Enema   • Cholecalciferol 2000 UNIT Cap     No current facility-administered medications for this visit.       REVIEW OF SYSTEM:    Constitutional: Denies fevers, Denies weight changes   Eyes: right eye retina bleeding  Ears/Nose/Throat/Mouth: Denies nasal congestion or sore throat   Cardiovascular: Denies chest pain or palpitations   Respiratory: Denies SOB.   Gastrointestinal/Hepatic: Denies abdominal pain, nausea, vomiting, diarrhea, constipation or GI bleeding   Genitourinary: Denies bladder dysfunction, dysuria or frequency   Musculoskeletal/Rheum: Denies joint pain and swelling   Skin/Breast: Denies rash, denies breast lumps or discharge   Neurological: meningioma, seizure, dizziness  Psychiatric: denies mood disorder   Endocrine: denies hx of diabetes or thyroid dysfunction   Heme/Oncology/Lymph Nodes: Denies enlarged lymph nodes, denies brusing or known bleeding disorder    "  Allergic/Immunologic: Denies hx of allergies         PHYSICAL AND NEUROLOGICAL EXMAINATIONS:  VITAL SIGNS: /76 mmHg  Pulse 77  Temp(Src) 36.4 °C (97.6 °F)  Resp 17  Ht 1.626 m (5' 4\")  Wt 64.955 kg (143 lb 3.2 oz)  BMI 24.57 kg/m2  SpO2 97%  CURRENT WEIGHT:   BMI: Body mass index is 24.57 kg/(m^2).  PREVIOUS WEIGHTS:  Wt Readings from Last 25 Encounters:   07/18/17 64.955 kg (143 lb 3.2 oz)   03/16/17 60.782 kg (134 lb)   12/15/16 61.236 kg (135 lb)   12/14/16 58.968 kg (130 lb)   08/11/16 63.504 kg (140 lb)   12/20/15 63.821 kg (140 lb 11.2 oz)   11/09/15 62.551 kg (137 lb 14.4 oz)   11/01/15 76.3 kg (168 lb 3.4 oz)   09/04/15 69.854 kg (154 lb)       General appearance of patient: WDWN(+) NAD(+)    EYES  o Fundus : Papilledem(-) Exudates(-) Hemorrhage(-)  Nervous System  Orientation to time, place and person(+)  Memory normal(-) recall 3/3  Language: aphasia(-)  Knowledge: past(+) Current(+)  Attention(+)  Right eye blindess  Cranial Nerves  • Nerve 2: intact  • Nerve 3,4,6: intact  • Nerve 5 : intact  • Nerve 7: intact  • Nerve 8: intact  • Nerve 9 & 10: intact  • Nerve 11: intact  • Nerve 12: intact  Muscle Power and muscle tone: mild left hemiparesis  Sensory System: Pin sensation intact(+)  Reflexes: symmetric increased over left limbs  Cerebellar Function FNP normal   Gait : general weakness-- still able to walk with cane  Heart and Vascular  Peripheral Vasucular system : Edema (-) Swelling(-)  RHB, Breathing sound clear  abdomen bowel sound normoactive  Extremities freely moveable  Joints no contracture       NEUROIMAGING: I reviewed the MRI/CT of brain       Able to walk with cane  Tendency  Bad headache      IMPRESSION:    1. 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 Dizziness spells since March 2016 (Keppra was tapered off this Feb)-- seizures are most likely ( abnormal EEG)--- Dizziness subsided after keppra " initially but came back recently  3. Hx of hypothyroidism  4. Seizure and poor memory 2015  5. Spells of hot -cold feelings, spells of GI upset  6. Falls, Bad headache, Bad dizziness    PLAN/RECOMMENDATIONS:      The major concerns--- headache daily, no energy, tiredness, hallucinations    Advise cut down on pain medicine-- including tylenol-- the less rescue medicine, the less likely to develop rebound headache  The goal is to limit all rescue medicine to less than 2# per week. The first 2 weeks of medicine withdrawal would be tough    ________________________________________________________________________    These dizziness spells are part of seizure spectrum-- not all seizures would shake or passing out     The recent EEG-- right side brain has active brain malfunction still      Nadia has poor tolerance of keppra--higher dose than 250mg TID made her quite sleepy    Continue keppra     With Lamictal 200mg AM 100mg HS-- patient has more headache and dizziness ( reduction of Lamictal made things even worse)    This time, we will add Topamax 50mg before sleep for one week, then up Topamax to 50mg two times per day since the second week      We could even consider 5 days inpatient video EEG to adjust seizure medication in hospital if we could not find the best combination of seizure medication in outpatient  ________________________________________________________________________      ________________________________________________________________________    Fish Oil -- Omega 3 1000mg 3#-6# daily ( Flaxseed oil is fine too)  Try Daily Probiotic too  Vit D-3 4000 unit daily  ________________________________________________________________________        ________________________________________________________________________      FOOD    Foods that combat inflammation     Include plenty of these anti-inflammatory foods in your diet:     tomatoes     olive oil     green leafy vegetables, such as spinach, kale, and  collards     nuts like almonds and walnuts     fatty fish like salmon, mackerel, tuna, and sardines     fruits such as strawberries, blueberries, cherries, and oranges      Foods that inflame     Try to avoid or limit these foods as much as possible:     refined carbohydrates, such as white bread and pastries     French fries and other fried foods     soda and other sugar-sweetened beverages     red meat (burgers, steaks) and processed meat (hot dogs, sausage)     margarine, shortening, and lard      [email or web address]    ________________________________________________________________________          SIGNATURE:  Kenisha Martel    CC:  CURRY [email or web address].    MRI of brain before and after surgery      INTERPRETATION:  This EEG denotes focal cortical dysfunction and active focal seizure   disorder coming from the right hemisphere probabley temporal lobe.       ____________________________________     MD CONSTANZA WHITMORE / MELLISSA    DD:  10/13/2016 08:20:01  DT:  10/13/2016 09:45:59      D#:  [id number]  Job#:  [id number]      EEG showed active epileptiform discharges from right hemisphere

## 2017-07-18 NOTE — MR AVS SNAPSHOT
"        Nadia Srivastava   2017 10:20 AM   Office Visit   MRN: [id number]    Department:  [facility]   Dept Phone:  [phone]    Description:  Female : 1955   Provider:  Kenisha Martel M.D.           Reason for Visit     Follow-Up 4m FV      Allergies as of 2017     Allergen Noted Reactions    Norco [Hydrocodone-Acetaminophen] 10/16/2015   Rash, Itching    Pt stated she becomes itchy when taken    Banana 2017       Pcn [Penicillins] 2015   Rash    As a child. Has not received pcn since.      You were diagnosed with     Meningioma (CMS-HCC)   [357140]       Seizure cerebral   [357093]         Vital Signs     Blood Pressure Pulse Temperature Respirations Height Weight    122/76 mmHg 77 36.4 °C (97.6 °F) 17 1.626 m (5' 4\") 64.955 kg (143 lb 3.2 oz)    Body Mass Index Oxygen Saturation Smoking Status             24.57 kg/m2 97% Never Smoker          Basic Information     Date Of Birth Sex Race Ethnicity Preferred Language    1955 Female White Non- English      Your appointments     2017  9:40 AM   Follow Up Visit with Kenisha Martel M.D.   [facility] (--)    [address] [place] [address], [address]  [facility] [address] [phone]           You will be receiving a confirmation call a few days before your appointment from our automated call confirmation system.              Problem List              ICD-10-CM Priority Class Noted - Resolved    Benign neoplasm of supratentorial region of brain (CMS-[facility]) D33.0 Medium  10/15/2015 - Present    Meningioma (CMS-HCC) D32.9 High  10/15/2015 - Present    Vasospasm of cerebral artery I67.848 Medium  10/18/2015 - Present    Respiratory failure following trauma and surgery (CMS-[facility]) J95.822 Medium  10/18/2015 - Present    GERD (gastroesophageal reflux disease) K21.9 Low  10/18/2015 - Present    Hypothyroidism E03.9 Low  10/18/2015 - Present    Hyperlipidemia E78.5 Low  10/18/2015 - Present    Inadequate " anticoagulation Z51.81, Z79.01 Medium  10/18/2015 - Present    Hyperglycemia R73.9 Medium  10/22/2015 - Present    Fever R50.9 Medium  10/23/2015 - Present    Debility R53.81 Medium  10/29/2015 - Present    Hyponatremia E87.1 Medium  10/29/2015 - Present    Dysphagia R13.10 High  10/31/2015 - Present    Elevated liver enzymes R74.8 Medium  11/1/2015 - Present    Left hemiparesis (CMS-HCC) G81.94 High Acute 11/3/2015 - Present    CVA, old, dysphagia I69.391   11/11/2015 - Present    Diabetes type 2, controlled (CMS-formerly [facility]) E11.9   11/18/2015 - Present    Hypokalemia E87.6   11/18/2015 - Present    Azotemia R79.89   11/18/2015 - Present    Seizure cerebral I67.89   8/11/2016 - Present      Health Maintenance        Date Due Completion Dates    DIABETES MONOFILAMENT / LE EXAM 1955 ---    RETINAL SCREENING 2/15/1971 ---    FASTING LIPID PROFILE 2/15/1971 ---    URINE ACR / MICROALBUMIN 2/15/1971 ---    IMM DTaP/Tdap/Td Vaccine (1 - Tdap) 2/15/1972 ---    IMM PNEUMOCOCCAL 19-64 (ADULT) MEDIUM RISK SERIES (1 of 1 - PPSV23) 2/15/1972 ---    PAP SMEAR 2/15/1974 ---    MAMMOGRAM 2/15/1993 ---    COLONOSCOPY 2/15/2003 ---    IMM ZOSTER VACCINE 2/15/2013 ---    A1C SCREENING 5/5/2016 11/5/2015    SERUM CREATININE 12/21/2016 12/21/2015, 12/20/2015, 12/14/2015, 12/13/2015, 12/11/2015, 12/10/2015, 12/5/2015, 11/30/2015, 11/29/2015, 11/26/2015, 11/22/2015, 11/19/2015, 11/16/2015, 11/13/2015, 11/10/2015, 11/7/2015, 11/6/2015, 11/5/2015, 11/3/2015, 11/1/2015, 10/31/2015, 10/30/2015, 10/29/2015, 10/28/2015, 10/27/2015, 10/26/2015, 10/25/2015, 10/24/2015, 10/23/2015, 10/23/2015, 10/23/2015, 10/22/2015, 10/22/2015, 10/22/2015, 10/22/2015, 10/22/2015, 10/21/2015, 10/21/2015, 10/21/2015, 10/21/2015, 10/20/2015, 10/20/2015, 10/20/2015, 10/20/2015, 10/20/2015, 10/19/2015, 10/19/2015, 10/19/2015, 10/18/2015, 10/18/2015, 10/18/2015, 10/18/2015, 10/17/2015, 10/17/2015, 10/17/2015, 10/17/2015, 9/6/2015, 9/5/2015    IMM INFLUENZA (1) 9/1/2017  12/18/2015            Current Immunizations     Influenza Vaccine Quad Inj (Pf) 12/18/2015  4:00 PM      Below and/or attached are the medications your provider expects you to take. Review all of your home medications and newly ordered medications with your provider and/or pharmacist. Follow medication instructions as directed by your provider and/or pharmacist. Please keep your medication list with you and share with your provider. Update the information when medications are discontinued, doses are changed, or new medications (including over-the-counter products) are added; and carry medication information at all times in the event of emergency situations     Allergies:  NORCO - Rash,Itching     BANANA - (reactions not documented)     PCN - Rash               Medications  Valid as of: July 18, 2017 - 11:05 AM    Generic Name Brand Name Tablet Size Instructions for use    Acetaminophen (Tab) Acetaminophen 650 MG Take 650 mg by mouth every 6 hours as needed (for temp over 101 F or mild/moderate pain).        Alum Hydroxide-Mag Trisilicate (Chew Tab) Alum Hydroxide-Mag Trisilicate 80-14.2 MG Take  by mouth 2 Times a Day.        Butalbital-APAP-Caffeine (Cap) FIORICET -40 MG Take 1 Cap by mouth every four hours as needed for Headache.        Calcium Carbonate Antacid (Chew Tab) Calcium Carbonate Antacid 1000 MG Take 1 Tab by mouth 3 times a day as needed (heartburn).        Cholecalciferol (Cap) Cholecalciferol 2000 UNIT Take 2,000 Units by mouth every day.        Cholecalciferol (Cap) Vitamin D 2000 UNITS Take  by mouth.        Cyanocobalamin (Tab) cyanocobalamin 100 MCG Take 1 Tab by mouth every day.        Diphenhydramine-Zinc Acetate (Cream) BENADRYL ITCH 1-0.1 % Apply to neck and chest 2 times daily as needed for itching        Estradiol (Tab) ESTRACE 1 MG Take 1 Tab by mouth every day.        Ferrous Sulfate (Tab) ferrous sulfate 325 (65 FE) MG Take 1 Tab by mouth 2 times a day, with meals.         LamoTRIgine (Tab) LAMICTAL 100 MG TAKE ONE TABLET ORALLY THREE TIMES DAILY.        LevETIRAcetam (Tab) KEPPRA 250 MG Take 1 Tab by mouth 3 times a day.        Levothyroxine Sodium (Tab) SYNTHROID 100 MCG Take 1 Tab by mouth Every morning on an empty stomach.        Levothyroxine Sodium (Tab) SYNTHROID 88 MCG Take 88 mcg by mouth Every morning on an empty stomach.        Lutein (Cap) Lutein 20 MG Take  by mouth.        Magnesium Hydroxide (Suspension) MILK OF MAGNESIA 400 MG/5ML Take 30 mL by mouth 1 time daily as needed (if no BM).        Magnesium Oxide (Tab) MAG- (241.3 MG) MG Take 1 Tab by mouth every day.        Norethindrone Acetate (Tab) AYGESTIN 5 MG Take 1 Tab by mouth every day.        Omeprazole (CAPSULE DELAYED RELEASE) PRILOSEC 40 MG Take 1 Cap by mouth every day.        Omeprazole (CAPSULE DELAYED RELEASE) PRILOSEC 20 MG Take 20 mg by mouth every day.        Phenyleph-Shark Beatrice Oil-MO-Pet (Ointment) FORMULATION R 0.25-3-14-71.9 % Insert 1 Application in rectum at bedtime as needed (pruritis/pain). Equivalent to Preparation H Ointment        Simvastatin (Tab) ZOCOR 40 MG Take 40 mg by mouth every evening.        Sodium Phosphates (Enema) fleet 7-19 GM/118ML Insert 133 mL in rectum 1 time daily as needed (if bisacodyl ineffective after one day or if dulcolax is not ordered).        Topiramate (Tab) TOPAMAX 50 MG Take 1 Tab by mouth 2 times a day.        Venlafaxine HCl (Tab) EFFEXOR 75 MG Take 75 mg by mouth 3 times a day.        Vitamin E (Cap) VITAMIN E 400 UNIT Take 1 Cap by mouth every day.        .                 Medicines prescribed today were sent to:     [facility] [address] [place] [address] [facility] [address]    Phone: [phone] Fax: [phone]    Open 24 Hours?: No      Medication refill instructions:       If your prescription bottle indicates you have medication refills left, it is not necessary to call your provider’s office. Please contact your  pharmacy and they will refill your medication.    If your prescription bottle indicates you do not have any refills left, you may request refills at any time through one of the following ways: The online [employer] system (except Urgent Care), by calling your provider’s office, or by asking your pharmacy to contact your provider’s office with a refill request. Medication refills are processed only during regular business hours and may not be available until the next business day. Your provider may request additional information or to have a follow-up visit with you prior to refilling your medication.   *Please Note: Medication refills are assigned a new Rx number when refilled electronically. Your pharmacy may indicate that no refills were authorized even though a new prescription for the same medication is available at the pharmacy. Please request the medicine by name with the pharmacy before contacting your provider for a refill.        Referral     A referral request has been sent to our patient care coordination department. Please allow 3-5 business days for us to process this request and contact you either by phone or mail. If you do not hear from us by the 5th business day, please call us at [phone].        Instructions      IMPRESSION:    1. 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 Dizziness spells since March 2016 (Keppra was tapered off this Feb)-- seizures are most likely ( abnormal EEG)--- Dizziness subsided after keppra initially but came back recently  3. Hx of hypothyroidism  4. Seizure and poor memory 2015  5. Spells of hot -cold feelings, spells of GI upset  6. Falls, Bad headache, Bad dizziness    PLAN/RECOMMENDATIONS:      The major concerns--- headache daily, no energy, tiredness, hallucinations    Advise cut down on pain medicine-- including tylenol-- the less rescue medicine, the less likely to develop rebound  headache  The goal is to limit all rescue medicine to less than 2# per week. The first 2 weeks of medicine withdrawal would be tough    ________________________________________________________________________    These dizziness spells are part of seizure spectrum-- not all seizures would shake or passing out     The recent EEG-- right side brain has active brain malfunction still      Nadia has poor tolerance of keppra--higher dose than 250mg TID made her quite sleepy    Continue keppra     With Lamictal 200mg AM 100mg HS-- patient has more headache and dizziness ( reduction of Lamictal made things even worse)    This time, we will add Topamax 50mg before sleep for one week, then up Topamax to 50mg two times per day since the second week      We could even consider 5 days inpatient video EEG to adjust seizure medication in hospital if we could not find the best combination of seizure medication in outpatient  ________________________________________________________________________      ________________________________________________________________________    Fish Oil -- Omega 3 1000mg 3#-6# daily ( Flaxseed oil is fine too)  Try Daily Probiotic too  Vit D-3 4000 unit daily  ________________________________________________________________________        ________________________________________________________________________      FOOD    Foods that combat inflammation     Include plenty of these anti-inflammatory foods in your diet:     tomatoes     olive oil     green leafy vegetables, such as spinach, kale, and collards     nuts like almonds and walnuts     fatty fish like salmon, mackerel, tuna, and sardines     fruits such as strawberries, blueberries, cherries, and oranges      Foods that inflame     Try to avoid or limit these foods as much as possible:     refined carbohydrates, such as white bread and pastries     French fries and other fried foods     soda and other sugar-sweetened beverages     red meat  (burgers, steaks) and processed meat (hot dogs, sausage)     margarine, shortening, and lard      [email or web address]    ________________________________________________________________________                Tzee Access Code: [email or web address]  Expires: 8/17/2017 11:05 AM    Tzee  A secure, online tool to manage your health information     [employer]’s Tzee® is a secure, online tool that connects you to your personalized health information from the privacy of your home -- day or night - making it very easy for you to manage your healthcare. Once the activation process is completed, you can even access your medical information using the Tzee kerry, which is available for free in the Apple Kerry store or Google Play store.     Tzee provides the following levels of access (as shown below):   My Chart Features   [facility] Doctor [facility]  Specialists [facility]  Urgent  Care Non-[facility]  Primary Care  Doctor   Email your healthcare team securely and privately 24/7 X X X    Manage appointments: schedule your next appointment; view details of past/upcoming appointments X      Request prescription refills. X      View recent personal medical records, including lab and immunizations X X X X   View health record, including health history, allergies, medications X X X X   Read reports about your outpatient visits, procedures, consult and ER notes X X X X   See your discharge summary, which is a recap of your hospital and/or ER visit that includes your diagnosis, lab results, and care plan. X X       How to register for Tzee:  1. Go to  [email or web address].  2. Click on the Sign Up Now box, which takes you to the New Member Sign Up page. You will need to provide the following information:  a. Enter your Tzee Access Code exactly as it appears at the top of this page. (You will not need to use this code after you’ve completed the sign-up  process. If you do not sign up before the expiration date, you must request a new code.)   b. Enter your date of birth.   c. Enter your home email address.   d. Click Submit, and follow the next screen’s instructions.  3. Create a Email Data Sourcet ID. This will be your Email Data Sourcet login ID and cannot be changed, so think of one that is secure and easy to remember.  4. Create a Email Data Sourcet password. You can change your password at any time.  5. Enter your Password Reset Question and Answer. This can be used at a later time if you forget your password.   6. Enter your e-mail address. This allows you to receive e-mail notifications when new information is available in expresscoin.  7. Click Sign Up. You can now view your health information.    For assistance activating your expresscoin account, call [phone]

## 2017-07-21 ENCOUNTER — TELEPHONE (OUTPATIENT)
Dept: NEUROLOGY | Facility: MEDICAL CENTER | Age: 62
End: 2017-07-21

## 2017-07-21 NOTE — TELEPHONE ENCOUNTER
Called and spoke to Nadia's caregiver in regards to having her 5 day EEG starting on Monday July 1st. They state they would like to cancel for now because she is doing a routine EEG instead. They said they will talk to Dr Martel at f/v and depending on results they will proceed with 5 day at a later date. I explained that the waiting list in long and we can put them down for another date but they said not at this time.

## 2017-07-27 ENCOUNTER — TELEPHONE (OUTPATIENT)
Dept: NEUROLOGY | Facility: MEDICAL CENTER | Age: 62
End: 2017-07-27

## 2017-07-27 NOTE — TELEPHONE ENCOUNTER
called patient took Topamax 50 mg for 3 days wiped her out and so they stopped it. They feel like with th

## 2017-08-03 ENCOUNTER — TELEPHONE (OUTPATIENT)
Dept: NEUROLOGY | Facility: MEDICAL CENTER | Age: 62
End: 2017-08-03

## 2017-08-03 ENCOUNTER — NON-PROVIDER VISIT (OUTPATIENT)
Dept: NEUROLOGY | Facility: MEDICAL CENTER | Age: 62
End: 2017-08-03
Payer: COMMERCIAL

## 2017-08-03 DIAGNOSIS — G40.909 SEIZURE CEREBRAL (HCC): ICD-10-CM

## 2017-08-03 PROCEDURE — 95819 EEG AWAKE AND ASLEEP: CPT | Performed by: PSYCHIATRY & NEUROLOGY

## 2017-08-03 RX ORDER — ZONISAMIDE 25 MG/1
50 CAPSULE ORAL
Qty: 60 CAP | Refills: 3 | Status: SHIPPED | OUTPATIENT
Start: 2017-08-03 | End: 2017-12-08

## 2017-08-03 NOTE — TELEPHONE ENCOUNTER
called patient took Topamax 50 mg for 3 days wiped her out and so they stopped it. They would like to try something else.  She is still getting headaches.    Please advise     Woo you

## 2017-08-03 NOTE — PROGRESS NOTES
EEG 08/03/2017 11:03 AM      ROUTINE ELECTROENCEPHALOGRAM REPORT        INDICATION:     July 2015 started having seizures, found to have brain tumor-- status post surgery oct 2015-- she ended in hospital for 2+ months. EEG done 10/2016-- active seizure focus from the right. status post right frontal craniotomy.  Per neurology=  1. 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 Dizziness spells since March 2016 (Keppra was tapered off this Feb)-- seizures are most likely ( abnormal EEG)--- Dizziness subsided after keppra initially but came back recently  3. Hx of hypothyroidism  4. Seizure and poor memory 2015  5. Spells of hot -cold feelings, spells of GI upset  6. Falls, Bad headache, Bad dizziness      TECHNIQUE: 30 channel routine electroencephalogram (EEG) was performed in accordance with the international 10-20 system. The study was reviewed in bipolar and referential montages. The recording examined the patient during wakeful and drowsy state(s).     DESCRIPTION OF THE RECORD:      Background rhythm during awake stage shows well-organized, well-developed, average voltage 9 to 10 hertz alpha activity in the posterior regions.  It blocks with eye opening and attenuated over the right hemisphere.  Frequent epileptic spikes and waves over the right temporal regions and delta slowing over the right hemisphere.  Photic stimulation did not produce any abnormalities. Hyperventilation did not produce any abnormalities. Stage I sleep was achieved.        ACTIVATION PROCEDURES:      Hyperventilation and Photic Stimulation were done    ICTAL AND/OR INTERICTAL FINDINGS:    Frequent epileptic spikes and waves over the right temporal regions and delta slowing over the right hemisphere.   No clinical events or seizures were reported or recorded during the study.      EKG: sampling of the EKG recording demonstrated sinus rhythm.         INTERPRETATION:      ________________________________________________________________________    This EEG is consistent with active focal seizure from the right temporal lobe    ________________________________________________________________________

## 2017-08-03 NOTE — MR AVS SNAPSHOT
Nadia Srivastava   8/3/2017 7:30 AM   Non-Provider Visit   MRN: [id number]    Department:  [facility]   Dept Phone:  [phone]    Description:  Female : 1955   Provider[place]  [place]AGNOST[place] [facility] [place] [facility] [place]           Reason for Visit     Procedure           Allergies as of 8/3/2017     Allergen Noted Reactions    Norco [Hydrocodone-Acetaminophen] 10/16/2015   Rash, Itching    Pt stated she becomes itchy when taken    Banana 2017       Pcn [Penicillins] 2015   Rash    As a child. Has not received pcn since.      You were diagnosed with     Seizure cerebral   [[id number]]         Vital Signs     Smoking Status                   Never Smoker            Basic Information     Date Of Birth Sex Race Ethnicity Preferred Language    1955 Female White Non- English      Your appointments     2017  9:40 AM   Follow Up Visit with Kenisha Martel M.D.   [facility] (--)    [address], [address]  [facility] [address] [phone]           You will be receiving a confirmation call a few days before your appointment from our automated call confirmation system.              Problem List              ICD-10-CM Priority Class Noted - Resolved    Benign neoplasm of supratentorial region of brain (CMS-[facility]) D33.0 Medium  10/15/2015 - Present    Meningioma (CMS-HCC) D32.9 High  10/15/2015 - Present    Vasospasm of cerebral artery I67.848 Medium  10/18/2015 - Present    Respiratory failure following trauma and surgery (CMS-HCC) J95.822 Medium  10/18/2015 - Present    GERD (gastroesophageal reflux disease) K21.9 Low  10/18/2015 - Present    Hypothyroidism E03.9 Low  10/18/2015 - Present    Hyperlipidemia E78.5 Low  10/18/2015 - Present    Inadequate anticoagulation Z51.81, Z79.01 Medium  10/18/2015 - Present    Hyperglycemia R73.9 Medium  10/22/2015 - Present    Fever R50.9 Medium  10/23/2015 - Present    Debility R53.81 Medium  10/29/2015 - Present    Hyponatremia E87.1  Medium  10/29/2015 - Present    Dysphagia R13.10 High  10/31/2015 - Present    Elevated liver enzymes R74.8 Medium  11/1/2015 - Present    Left hemiparesis (CMS-HCC) G81.94 High Acute 11/3/2015 - Present    CVA, old, dysphagia I69.391   11/11/2015 - Present    Diabetes type 2, controlled (CMS-HCC) E11.9   11/18/2015 - Present    Hypokalemia E87.6   11/18/2015 - Present    Azotemia R79.89   11/18/2015 - Present    Seizure cerebral I67.89   8/11/2016 - Present      Health Maintenance        Date Due Completion Dates    DIABETES MONOFILAMENT / LE EXAM 1955 ---    RETINAL SCREENING 2/15/1971 ---    FASTING LIPID PROFILE 2/15/1971 ---    URINE ACR / MICROALBUMIN 2/15/1971 ---    IMM DTaP/Tdap/Td Vaccine (1 - Tdap) 2/15/1972 ---    IMM PNEUMOCOCCAL 19-64 (ADULT) MEDIUM RISK SERIES (1 of 1 - PPSV23) 2/15/1972 ---    PAP SMEAR 2/15/1974 ---    MAMMOGRAM 2/15/1993 ---    COLONOSCOPY 2/15/2003 ---    IMM ZOSTER VACCINE 2/15/2013 ---    A1C SCREENING 5/5/2016 11/5/2015    SERUM CREATININE 12/21/2016 12/21/2015, 12/20/2015, 12/14/2015, 12/13/2015, 12/11/2015, 12/10/2015, 12/5/2015, 11/30/2015, 11/29/2015, 11/26/2015, 11/22/2015, 11/19/2015, 11/16/2015, 11/13/2015, 11/10/2015, 11/7/2015, 11/6/2015, 11/5/2015, 11/3/2015, 11/1/2015, 10/31/2015, 10/30/2015, 10/29/2015, 10/28/2015, 10/27/2015, 10/26/2015, 10/25/2015, 10/24/2015, 10/23/2015, 10/23/2015, 10/23/2015, 10/22/2015, 10/22/2015, 10/22/2015, 10/22/2015, 10/22/2015, 10/21/2015, 10/21/2015, 10/21/2015, 10/21/2015, 10/20/2015, 10/20/2015, 10/20/2015, 10/20/2015, 10/20/2015, 10/19/2015, 10/19/2015, 10/19/2015, 10/18/2015, 10/18/2015, 10/18/2015, 10/18/2015, 10/17/2015, 10/17/2015, 10/17/2015, 10/17/2015, 9/6/2015, 9/5/2015    IMM INFLUENZA (1) 9/1/2017 12/18/2015            Current Immunizations     Influenza Vaccine Quad Inj (Pf) 12/18/2015  4:00 PM      Below and/or attached are the medications your provider expects you to take. Review all of your home medications and  newly ordered medications with your provider and/or pharmacist. Follow medication instructions as directed by your provider and/or pharmacist. Please keep your medication list with you and share with your provider. Update the information when medications are discontinued, doses are changed, or new medications (including over-the-counter products) are added; and carry medication information at all times in the event of emergency situations     Allergies:  NORCO - Rash,Itching     BANANA - (reactions not documented)     PCN - Rash               Medications  Valid as of: August 03, 2017 -  9:59 AM    Generic Name Brand Name Tablet Size Instructions for use    Acetaminophen (Tab) Acetaminophen 650 MG Take 650 mg by mouth every 6 hours as needed (for temp over 101 F or mild/moderate pain).        Alum Hydroxide-Mag Trisilicate (Chew Tab) Alum Hydroxide-Mag Trisilicate 80-14.2 MG Take  by mouth 2 Times a Day.        Butalbital-APAP-Caffeine (Cap) FIORICET -40 MG Take 1 Cap by mouth every four hours as needed for Headache.        Calcium Carbonate Antacid (Chew Tab) Calcium Carbonate Antacid 1000 MG Take 1 Tab by mouth 3 times a day as needed (heartburn).        Cholecalciferol (Cap) Cholecalciferol 2000 UNIT Take 2,000 Units by mouth every day.        Cholecalciferol (Cap) Vitamin D 2000 UNITS Take  by mouth.        Cyanocobalamin (Tab) cyanocobalamin 100 MCG Take 1 Tab by mouth every day.        Diphenhydramine-Zinc Acetate (Cream) BENADRYL ITCH 1-0.1 % Apply to neck and chest 2 times daily as needed for itching        Estradiol (Tab) ESTRACE 1 MG Take 1 Tab by mouth every day.        Ferrous Sulfate (Tab) ferrous sulfate 325 (65 FE) MG Take 1 Tab by mouth 2 times a day, with meals.        LamoTRIgine (Tab) LAMICTAL 100 MG TAKE ONE TABLET ORALLY THREE TIMES DAILY.        LevETIRAcetam (Tab) KEPPRA 250 MG Take 1 Tab by mouth 3 times a day.        Levothyroxine Sodium (Tab) SYNTHROID 100 MCG Take 1 Tab by mouth Every  morning on an empty stomach.        Levothyroxine Sodium (Tab) SYNTHROID 88 MCG Take 88 mcg by mouth Every morning on an empty stomach.        Lutein (Cap) Lutein 20 MG Take  by mouth.        Magnesium Hydroxide (Suspension) MILK OF MAGNESIA 400 MG/5ML Take 30 mL by mouth 1 time daily as needed (if no BM).        Magnesium Oxide (Tab) MAG- (241.3 MG) MG Take 1 Tab by mouth every day.        Norethindrone Acetate (Tab) AYGESTIN 5 MG Take 1 Tab by mouth every day.        Omeprazole (CAPSULE DELAYED RELEASE) PRILOSEC 40 MG Take 1 Cap by mouth every day.        Omeprazole (CAPSULE DELAYED RELEASE) PRILOSEC 20 MG Take 20 mg by mouth every day.        Phenyleph-Shark Beatrice Oil-MO-Pet (Ointment) FORMULATION R 0.25-3-14-71.9 % Insert 1 Application in rectum at bedtime as needed (pruritis/pain). Equivalent to Preparation H Ointment        Simvastatin (Tab) ZOCOR 40 MG Take 40 mg by mouth every evening.        Sodium Phosphates (Enema) fleet 7-19 GM/118ML Insert 133 mL in rectum 1 time daily as needed (if bisacodyl ineffective after one day or if dulcolax is not ordered).        Topiramate (Tab) TOPAMAX 50 MG Take 1 Tab by mouth 2 times a day.        Venlafaxine HCl (Tab) EFFEXOR 75 MG Take 75 mg by mouth 3 times a day.        Vitamin E (Cap) VITAMIN E 400 UNIT Take 1 Cap by mouth every day.        .                 Medicines prescribed today were sent to:     [address] [address]    Phone: [phone] Fax: [phone]    Open 24 Hours?: No      Medication refill instructions:       If your prescription bottle indicates you have medication refills left, it is not necessary to call your provider’s office. Please contact your pharmacy and they will refill your medication.    If your prescription bottle indicates you do not have any refills left, you may request refills at any time through one of the following ways: The online Fiestah system (except  Urgent Care), by calling your provider’s office, or by asking your pharmacy to contact your provider’s office with a refill request. Medication refills are processed only during regular business hours and may not be available until the next business day. Your provider may request additional information or to have a follow-up visit with you prior to refilling your medication.   *Please Note: Medication refills are assigned a new Rx number when refilled electronically. Your pharmacy may indicate that no refills were authorized even though a new prescription for the same medication is available at the pharmacy. Please request the medicine by name with the pharmacy before contacting your provider for a refill.           Oncovision Access Code: [email or web address]  Expires: 8/17/2017 11:05 AM    Oncovision  A secure, online tool to manage your health information     [employer]’s Oncovision® is a secure, online tool that connects you to your personalized health information from the privacy of your home -- day or night - making it very easy for you to manage your healthcare. Once the activation process is completed, you can even access your medical information using the Oncovision kerry, which is available for free in the Apple Kerry store or Google Play store.     Oncovision provides the following levels of access (as shown below):   My Chart Features   [facility] Doctor [facility]  Specialists [facility]  Urgent  Care Non-[facility]  Primary Care  Doctor   Email your healthcare team securely and privately 24/7 X X X    Manage appointments: schedule your next appointment; view details of past/upcoming appointments X      Request prescription refills. X      View recent personal medical records, including lab and immunizations X X X X   View health record, including health history, allergies, medications X X X X   Read reports about your outpatient visits, procedures, consult and ER notes X X X X   See your discharge summary, which is a recap  of your hospital and/or ER visit that includes your diagnosis, lab results, and care plan. X X       How to register for Jascha:  1. Go to  [email or web address].  2. Click on the Sign Up Now box, which takes you to the New Member Sign Up page. You will need to provide the following information:  a. Enter your Jascha Access Code exactly as it appears at the top of this page. (You will not need to use this code after you’ve completed the sign-up process. If you do not sign up before the expiration date, you must request a new code.)   b. Enter your date of birth.   c. Enter your home email address.   d. Click Submit, and follow the next screen’s instructions.  3. Create a Jascha ID. This will be your Jascha login ID and cannot be changed, so think of one that is secure and easy to remember.  4. Create a VasSolt password. You can change your password at any time.  5. Enter your Password Reset Question and Answer. This can be used at a later time if you forget your password.   6. Enter your e-mail address. This allows you to receive e-mail notifications when new information is available in Jascha.  7. Click Sign Up. You can now view your health information.    For assistance activating your Jascha account, call [phone]

## 2017-08-04 NOTE — TELEPHONE ENCOUNTER
Spoke to  notified of drug change to Zonegran and gave instructions. He expressed understanding and will call if any questions.

## 2017-11-02 ENCOUNTER — OFFICE VISIT (OUTPATIENT)
Dept: NEUROLOGY | Facility: MEDICAL CENTER | Age: 62
End: 2017-11-02
Payer: COMMERCIAL

## 2017-11-02 ENCOUNTER — HOSPITAL ENCOUNTER (OUTPATIENT)
Dept: LAB | Facility: MEDICAL CENTER | Age: 62
End: 2017-11-02
Attending: PSYCHIATRY & NEUROLOGY
Payer: COMMERCIAL

## 2017-11-02 VITALS
BODY MASS INDEX: 24.24 KG/M2 | SYSTOLIC BLOOD PRESSURE: 110 MMHG | OXYGEN SATURATION: 96 % | HEIGHT: 64 IN | RESPIRATION RATE: 16 BRPM | DIASTOLIC BLOOD PRESSURE: 60 MMHG | TEMPERATURE: 98.3 F | WEIGHT: 142 LBS | HEART RATE: 81 BPM

## 2017-11-02 DIAGNOSIS — G40.909 SEIZURE CEREBRAL (HCC): ICD-10-CM

## 2017-11-02 DIAGNOSIS — D32.9 MENINGIOMA (HCC): ICD-10-CM

## 2017-11-02 DIAGNOSIS — E03.8 OTHER SPECIFIED HYPOTHYROIDISM: ICD-10-CM

## 2017-11-02 LAB — THYROPEROXIDASE AB SERPL-ACNC: 0.3 IU/ML (ref 0–9)

## 2017-11-02 PROCEDURE — 80175 DRUG SCREEN QUAN LAMOTRIGINE: CPT

## 2017-11-02 PROCEDURE — 84207 ASSAY OF VITAMIN B-6: CPT

## 2017-11-02 PROCEDURE — 36415 COLL VENOUS BLD VENIPUNCTURE: CPT

## 2017-11-02 PROCEDURE — 99214 OFFICE O/P EST MOD 30 MIN: CPT | Performed by: PSYCHIATRY & NEUROLOGY

## 2017-11-02 PROCEDURE — 86800 THYROGLOBULIN ANTIBODY: CPT

## 2017-11-02 PROCEDURE — 80177 DRUG SCRN QUAN LEVETIRACETAM: CPT

## 2017-11-02 PROCEDURE — 86376 MICROSOMAL ANTIBODY EACH: CPT

## 2017-11-02 RX ORDER — BUTALBITAL, ACETAMINOPHEN, CAFFEINE AND CODEINE PHOSPHATE 50; 325; 40; 30 MG/1; MG/1; MG/1; MG/1
1 CAPSULE ORAL EVERY 12 HOURS PRN
Qty: 10 CAP | Refills: 1 | Status: SHIPPED | OUTPATIENT
Start: 2017-11-02 | End: 2018-01-10

## 2017-11-02 RX ORDER — CHLORAL HYDRATE 500 MG
1000 CAPSULE ORAL
COMMUNITY

## 2017-11-02 RX ORDER — LAMOTRIGINE 100 MG/1
100 TABLET ORAL 3 TIMES DAILY
Qty: 270 TAB | Refills: 1 | Status: ON HOLD | OUTPATIENT
Start: 2017-11-02 | End: 2018-01-06

## 2017-11-02 RX ORDER — LEVETIRACETAM 250 MG/1
250 TABLET ORAL 3 TIMES DAILY
Qty: 270 TAB | Refills: 1 | Status: ON HOLD | OUTPATIENT
Start: 2017-11-02 | End: 2018-01-06

## 2017-11-02 RX ORDER — ZONISAMIDE 50 MG/1
100 CAPSULE ORAL
Qty: 60 CAP | Refills: 4 | Status: SHIPPED | OUTPATIENT
Start: 2017-11-02 | End: 2017-11-28 | Stop reason: SDUPTHER

## 2017-11-02 NOTE — PROGRESS NOTES
NEUROLOGY NOTE    Referring Physician  Brittney Wells      CHIEF COMPLAINT:    Dizziness spells persisted but better with current antiseizure medication    The EEG done just days ago-- active seizure focus from the right still  Left hand muscle strength improved with keppra  July 2015 started having seizures, found to have brain tumor-- status post surgery oct 2015-- she ended in hospital for 2+ months  keppra was stopped this Feb, she started having dizziness spells since March  She then had extensive work up in ENT doctor  Chief Complaint   Patient presents with   • Follow-Up     Meningoma       PRESENT ILLNESS:   Dizziness spells persisted but better with current antiseizure medication      Left hand muscle strength improved with keppra  July 2015 started having seizures, found to have brain tumor-- status post surgery oct 2015-- she ended in hospital for 2+ months  keppra was stopped this Feb, she started having dizziness spells since March  She then had extensive work up in ENT doctor    She could walk with cane-- today sitting on the wheelchair    Right eye retina -- injuried    She could not tolerate seizure mediation well  Not very eager to come to hospital for video EEG    PAST MEDICAL HISTORY:  Past Medical History:   Diagnosis Date   • Anesthesia 2010    difficulty swallowing post neck surgery   • Bell's palsy     Hx   • Bronchitis     hx    • Cataract     Right eye removed. Has cataract on left eye   • Dental disorder     grinds teeth, uses mouth guard at night   • Diabetes (CMS-HCC)     was on oral meds, stopped because FS are low   • Disorder of thyroid    • Hiatus hernia syndrome    • High cholesterol    • Memory difficulties     r/t tumor   • Myopia of both eyes    • Pneumonia 2006    pneumonia and whopping cough    • Seizure (CMS-HCC)     last 9/4/15   • Snoring    • Stroke (CMS-HCC)    • Urinary incontinence        PAST SURGICAL HISTORY:  Past Surgical History:   Procedure Laterality Date   •  GASTROSCOPY-ENDO  11/11/2015    Procedure: GASTROSCOPY-ENDO;  Surgeon: Cahrles Khan M.D.;  Location: ENDOSCOPY [facility];  Service:    • PEG PLACEMENT  11/11/2015    Procedure: PEG PLACEMENT;  Surgeon: Charles Khan M.D.;  Location: ENDOSCOPY [facility];  Service:    • CRANIOTOMY TUMOR Right 10/16/2015    Procedure: CRANIOTOMY TUMOR for meningioma;  Surgeon: Carlton Faria M.D.;  Location: SURGERY [facility];  Service:    • RECOVERY  10/15/2015    Procedure: IR2-Cerebral angiogram w/poss embolization  -;  Surgeon: Ir-Recovery Surgery;  Location: SURGERY [facility];  Service:    • OTHER ORTHOPEDIC SURGERY Left 01/2015    rotator cuff    • OTHER  2014    Right eye cataract removel    • JUDY BY LAPAROSCOPY  2014   • COLONOSCOPY     • GYN SURGERY      ablation   • OTHER NEUROLOGICAL SURG      fusion c spine       FAMILY HISTORY:  No family history on file.    SOCIAL HISTORY:  Social History     Social History   • Marital status:      Spouse name: N/A   • Number of children: N/A   • Years of education: N/A     Occupational History   • Not on file.     Social History Main Topics   • Smoking status: Never Smoker   • Smokeless tobacco: Never Used   • Alcohol use No   • Drug use: No   • Sexual activity: Not on file     Other Topics Concern   • Not on file     Social History Narrative   • No narrative on file     ALLERGIES:  Allergies   Allergen Reactions   • Norco [Hydrocodone-Acetaminophen] Rash and Itching     Pt stated she becomes itchy when taken   • Banana    • Pcn [Penicillins] Rash     As a child. Has not received pcn since.     TOBHX  History   Smoking Status   • Never Smoker   Smokeless Tobacco   • Never Used     ALCHX  History   Alcohol Use No     DRUGHX  History   Drug Use No           MEDICATIONS:  Current Outpatient Prescriptions   Medication   • Omega-3 Fatty Acids (FISH OIL) 1000 MG Cap capsule   • zonisamide (ZONEGRAN) 25 MG capsule   • lamotrigine  (LAMICTAL) 100 MG Tab   • levetiracetam (KEPPRA) 250 MG tablet   • levothyroxine (SYNTHROID) 88 MCG Tab   • simvastatin (ZOCOR) 40 MG Tab   • venlafaxine (EFFEXOR) 75 MG Tab   • Cholecalciferol (VITAMIN D) 2000 UNITS Cap   • Lutein 20 MG Cap   • Alum Hydroxide-Mag Trisilicate 80-14.2 MG Chew Tab   • calcium carbonate 1000 MG Chew Tab   • estradiol (ESTRACE) 1 MG Tab   • cyanocobalamin 100 MCG tablet   • norethindrone (AYGESTIN) 5 MG tablet   • levothyroxine (SYNTHROID) 100 MCG Tab   • omeprazole (PRILOSEC) 40 MG delayed-release capsule   • Cholecalciferol 2000 UNIT Cap   • acetaminophen/caffeine/butalbital 300-40-50 mg (FIORICET) -40 MG Cap capsule   • omeprazole (PRILOSEC) 20 MG delayed-release capsule   • Acetaminophen 650 MG Tab   • FORMULATION R 0.25-3-14-71.9 % Ointment   • diphenhydramine-ZnAcetate (BENADRYL ITCH) 1-0.1 % Cream   • ferrous sulfate 325 (65 FE) MG tablet   • magnesium hydroxide (MILK OF MAGNESIA) 400 MG/5ML Suspension   • magnesium oxide (MAG-OX) 400 (241.3 MG) MG Tab tablet   • vitamin e (VITAMIN E) 400 UNIT Cap   • Sodium Phosphates (FLEET) 7-19 GM/118ML Enema     No current facility-administered medications for this visit.        REVIEW OF SYSTEM:    Constitutional: Denies fevers, Denies weight changes   Eyes: right eye retina bleeding  Ears/Nose/Throat/Mouth: Denies nasal congestion or sore throat   Cardiovascular: Denies chest pain or palpitations   Respiratory: Denies SOB.   Gastrointestinal/Hepatic: Denies abdominal pain, nausea, vomiting, diarrhea, constipation or GI bleeding   Genitourinary: Denies bladder dysfunction, dysuria or frequency   Musculoskeletal/Rheum: Denies joint pain and swelling   Skin/Breast: Denies rash, denies breast lumps or discharge   Neurological: meningioma, seizure, dizziness  Psychiatric: denies mood disorder   Endocrine: denies hx of diabetes or thyroid dysfunction   Heme/Oncology/Lymph Nodes: Denies enlarged lymph nodes, denies brusing or known bleeding  "disorder   Allergic/Immunologic: Denies hx of allergies         PHYSICAL AND NEUROLOGICAL EXMAINATIONS:  VITAL SIGNS: /60   Pulse 81   Temp 36.8 °C (98.3 °F)   Resp 16   Ht 1.626 m (5' 4\")   Wt 64.4 kg (142 lb)   SpO2 96%   BMI 24.37 kg/m²   CURRENT WEIGHT:   BMI: Body mass index is 24.37 kg/m².  PREVIOUS WEIGHTS:  Wt Readings from Last 25 Encounters:   11/02/17 64.4 kg (142 lb)   07/18/17 65 kg (143 lb 3.2 oz)   03/16/17 60.8 kg (134 lb)   12/15/16 61.2 kg (135 lb)   12/14/16 59 kg (130 lb)   08/11/16 63.5 kg (140 lb)   12/20/15 63.8 kg (140 lb 11.2 oz)   11/09/15 62.6 kg (137 lb 14.4 oz)   11/01/15 76.3 kg (168 lb 3.4 oz)   09/04/15 69.9 kg (154 lb)       General appearance of patient: WDWN(+) NAD(+)    EYES  o Fundus : Papilledem(-) Exudates(-) Hemorrhage(-)  Nervous System  Orientation to time, place and person(+)  Memory normal(-) recall 3/3  Language: aphasia(-)  Knowledge: past(+) Current(+)  Attention(+)  Right eye blindess  Cranial Nerves  • Nerve 2: intact  • Nerve 3,4,6: intact  • Nerve 5 : intact  • Nerve 7: intact  • Nerve 8: intact  • Nerve 9 & 10: intact  • Nerve 11: intact  • Nerve 12: intact  Muscle Power and muscle tone: mild left hemiparesis  Sensory System: Pin sensation intact(+)  Reflexes: symmetric increased over left limbs  Cerebellar Function FNP normal   Gait : general weakness-- still able to walk with cane  Heart and Vascular  Peripheral Vasucular system : Edema (-) Swelling(-)  RHB, Breathing sound clear  abdomen bowel sound normoactive  Extremities freely moveable  Joints no contracture       NEUROIMAGING: I reviewed the MRI/CT of brain       Able to walk with cane  Tendency  Bad headache      IMPRESSION:    1. 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 Dizziness spells since March 2016 (Keppra was tapered off this Feb)-- seizures are most likely ( abnormal EEG)--- Dizziness subsided after keppra " initially but came back recently  3. Hx of hypothyroidism  4. Seizure and poor memory 2015  5. Spells of hot -cold feelings-- remains, spells of GI upset  6. Falls, Bad headache, Bad dizziness    PLAN/RECOMMENDATIONS:      The major concerns--- headache daily, no energy, tiredness, hallucinations    Advise cut down on pain medicine-- including tylenol-- the less rescue medicine, the less likely to develop rebound headache  The goal is to limit all rescue medicine to less than 2# per week. The first 2 weeks of medicine withdrawal would be tough    ________________________________________________________________________    These dizziness spells are part of seizure spectrum-- not all seizures would shake or passing out     The recent EEG Aug 2017-- right side brain has active brain malfunction      Nadia has poor tolerance of keppra--higher dose than 250mg TID made her quite sleepy  Topamax made her very poor appetitie    Continue keppra 250mg three times per day  Continue Lamictal 200mg AM 100mg HS-- ( could not tolerate higher dose---patient has more headache and dizziness but reduction of Lamictal made things even worse)  This time we will up the Zonegran to 100mg before sleep    We could even consider 5 days inpatient video EEG to adjust seizure medication in hospital if we could not find the best combination of seizure medication in outpatient  ________________________________________________________________________      ________________________________________________________________________    Fish Oil -- Omega 3 1000mg 3#-6# daily ( Flaxseed oil is fine too)  Try Daily Probiotic too  Vit D-3 4000 unit daily  ________________________________________________________________________    Aug 2017 EEG  ________________________________________________________________________     This EEG is consistent with active focal seizure from the right temporal  lobe     ________________________________________________________________________                         SIGNATURE:  Kenisha Martel    CC:  CURRY RodriguezNGONZALEZ    MRI of brain before and after surgery      INTERPRETATION:  This EEG denotes focal cortical dysfunction and active focal seizure   disorder coming from the right hemisphere probabley temporal lobe.       ____________________________________     MD CONSTANZA WHITMORE / MELLISSA    DD:  10/13/2016 08:20:01  DT:  10/13/2016 09:45:59      D#:  [id number]  Job#:  [id number]      EEG showed active epileptiform discharges from right hemisphere

## 2017-11-02 NOTE — PATIENT INSTRUCTIONS
IMPRESSION:     1. 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 Dizziness spells since March 2016 (Keppra was tapered off this Feb)-- seizures are most likely ( abnormal EEG)--- Dizziness subsided after keppra initially but came back recently  3. Hx of hypothyroidism  4. Seizure and poor memory 2015  5. Spells of hot -cold feelings-- remains, spells of GI upset  6. Falls, Bad headache, Bad dizziness     PLAN/RECOMMENDATIONS:        The major concerns--- headache daily, no energy, tiredness, hallucinations     Advise cut down on pain medicine-- including tylenol-- the less rescue medicine, the less likely to develop rebound headache  The goal is to limit all rescue medicine to less than 2# per week. The first 2 weeks of medicine withdrawal would be tough     ________________________________________________________________________     These dizziness spells are part of seizure spectrum-- not all seizures would shake or passing out      The recent EEG Aug 2017-- right side brain has active brain malfunction        Nadia has poor tolerance of keppra--higher dose than 250mg TID made her quite sleepy  Topamax made her very poor appetitie     Continue keppra 250mg three times per day  Continue Lamictal 200mg AM 100mg HS-- ( could not tolerate higher dose---patient has more headache and dizziness but reduction of Lamictal made things even worse)  This time we will up the Zonegran to 100mg before sleep     We could even consider 5 days inpatient video EEG to adjust seizure medication in hospital if we could not find the best combination of seizure medication in outpatient  ________________________________________________________________________        ________________________________________________________________________     Fish Oil -- Omega 3 1000mg 3#-6# daily ( Flaxseed oil is fine too)  Try Daily Probiotic too  Vit D-3 4000 unit  daily  ________________________________________________________________________     Aug 2017 EEG  ________________________________________________________________________     This EEG is consistent with active focal seizure from the right temporal lobe     ________________________________________________________________________

## 2017-11-03 LAB
LAMOTRIGINE SERPL-MCNC: 4.3 UG/ML (ref 2.5–15)
LEVETIRACETAM SERPL-MCNC: 11 UG/ML (ref 12–46)

## 2017-11-04 LAB — THYROGLOB AB SERPL-ACNC: <0.9 IU/ML (ref 0–4)

## 2017-11-08 LAB — VIT B6 SERPL-MCNC: 32.7 NMOL/L (ref 20–125)

## 2017-11-28 ENCOUNTER — TELEPHONE (OUTPATIENT)
Dept: NEUROLOGY | Facility: MEDICAL CENTER | Age: 62
End: 2017-11-28

## 2017-11-28 DIAGNOSIS — G40.909 SEIZURE CEREBRAL (HCC): ICD-10-CM

## 2017-11-28 RX ORDER — ZONISAMIDE 100 MG/1
200 CAPSULE ORAL
Qty: 60 CAP | Refills: 3 | Status: SHIPPED | OUTPATIENT
Start: 2017-11-28 | End: 2017-12-08

## 2017-11-28 NOTE — TELEPHONE ENCOUNTER
Patient called wants Zonisamide increased. She has been having a lot of itching, HA,dizziness.  feels like she is having  bad effects from one of her medicines.    Please advise    Thank you

## 2017-11-29 NOTE — TELEPHONE ENCOUNTER
"Kenisha Martel M.D.  [place], Med Ass't   Caller: Unspecified (Yesterday,  3:54 PM)             Itching could come from seizure medication too?   I will need to see her if any \"rash\" or \"persistent itching\"     And fine to increase Zonegran to 200mg HS   New Order in EPIC          Spoke to  and gave above information. I offered appt for Friday but he will have to call back to confirm if he can take appt    "

## 2017-11-30 NOTE — TELEPHONE ENCOUNTER
"Itching could come from seizure medication too?   I will need to see her if any \"rash\" or \"persistent itching\"     And fine to increase Zonegran to 200mg HS   New Order in EPIC     Spoke with  gave above information. I offered appt for Friday but they come along way. I told them tele med would call and set up appt.  "

## 2017-12-07 ENCOUNTER — TELEPHONE (OUTPATIENT)
Dept: NEUROLOGY | Facility: MEDICAL CENTER | Age: 62
End: 2017-12-07

## 2017-12-07 NOTE — TELEPHONE ENCOUNTER
called stating patient is doing really bad, she is very confused and just declining. I offered him appointment for tomorrow but he stated he would have to call back.

## 2017-12-08 ENCOUNTER — OFFICE VISIT (OUTPATIENT)
Dept: NEUROLOGY | Facility: MEDICAL CENTER | Age: 62
End: 2017-12-08
Payer: COMMERCIAL

## 2017-12-08 ENCOUNTER — HOSPITAL ENCOUNTER (OUTPATIENT)
Dept: LAB | Facility: MEDICAL CENTER | Age: 62
End: 2017-12-08
Attending: PSYCHIATRY & NEUROLOGY
Payer: COMMERCIAL

## 2017-12-08 VITALS
HEIGHT: 64 IN | RESPIRATION RATE: 16 BRPM | DIASTOLIC BLOOD PRESSURE: 56 MMHG | BODY MASS INDEX: 24.74 KG/M2 | TEMPERATURE: 99.9 F | OXYGEN SATURATION: 98 % | WEIGHT: 144.9 LBS | SYSTOLIC BLOOD PRESSURE: 106 MMHG | HEART RATE: 74 BPM

## 2017-12-08 DIAGNOSIS — G40.919 INTRACTABLE SEIZURES (HCC): ICD-10-CM

## 2017-12-08 LAB
CRP SERPL HS-MCNC: 0.15 MG/DL (ref 0–0.75)
ERYTHROCYTE [SEDIMENTATION RATE] IN BLOOD BY WESTERGREN METHOD: 12 MM/HOUR (ref 0–30)

## 2017-12-08 PROCEDURE — 85652 RBC SED RATE AUTOMATED: CPT

## 2017-12-08 PROCEDURE — 36415 COLL VENOUS BLD VENIPUNCTURE: CPT

## 2017-12-08 PROCEDURE — 86140 C-REACTIVE PROTEIN: CPT

## 2017-12-08 PROCEDURE — 80175 DRUG SCREEN QUAN LAMOTRIGINE: CPT

## 2017-12-08 PROCEDURE — 99214 OFFICE O/P EST MOD 30 MIN: CPT | Performed by: PSYCHIATRY & NEUROLOGY

## 2017-12-08 NOTE — PROGRESS NOTES
NEUROLOGY NOTE    Referring Physician  Brittney Wells      CHIEF COMPLAINT:    Itching for one month    Dizziness spells persisted but better with current antiseizure medication    The EEG done just days ago-- active seizure focus from the right still  Left hand muscle strength improved with keppra  July 2015 started having seizures, found to have brain tumor-- status post surgery oct 2015-- she ended in hospital for 2+ months  keppra was stopped this Feb, she started having dizziness spells since March  She then had extensive work up in ENT doctor  Chief Complaint   Patient presents with   • Follow-Up     Seizure cerebral       PRESENT ILLNESS:   Dizziness spells persisted but better with current antiseizure medication      Left hand muscle strength improved with keppra  July 2015 started having seizures, found to have brain tumor-- status post surgery oct 2015-- she ended in hospital for 2+ months  keppra was stopped this Feb, she started having dizziness spells since March  She then had extensive work up in ENT doctor    She could walk with cane-- today sitting on the wheelchair    Right eye retina -- injuried    She could not tolerate seizure mediation well  Not very eager to come to hospital for video EEG    PAST MEDICAL HISTORY:  Past Medical History:   Diagnosis Date   • Anesthesia 2010    difficulty swallowing post neck surgery   • Bell's palsy     Hx   • Bronchitis     hx    • Cataract     Right eye removed. Has cataract on left eye   • Dental disorder     grinds teeth, uses mouth guard at night   • Diabetes (CMS-HCC)     was on oral meds, stopped because FS are low   • Disorder of thyroid    • Hiatus hernia syndrome    • High cholesterol    • Memory difficulties     r/t tumor   • Myopia of both eyes    • Pneumonia 2006    pneumonia and whopping cough    • Seizure (CMS-[facility])     last 9/4/15   • Snoring    • Stroke (CMS-HCC)    • Urinary incontinence        PAST SURGICAL HISTORY:  Past Surgical History:    Procedure Laterality Date   • GASTROSCOPY-ENDO  11/11/2015    Procedure: GASTROSCOPY-ENDO;  Surgeon: Charles Khan M.D.;  Location: ENDOSCOPY [facility];  Service:    • PEG PLACEMENT  11/11/2015    Procedure: PEG PLACEMENT;  Surgeon: Charles Khan M.D.;  Location: ENDOSCOPY [facility];  Service:    • CRANIOTOMY TUMOR Right 10/16/2015    Procedure: CRANIOTOMY TUMOR for meningioma;  Surgeon: Carlton Faria M.D.;  Location: SURGERY [facility];  Service:    • RECOVERY  10/15/2015    Procedure: IR2-Cerebral angiogram w/poss embolization  ;  Surgeon: Ir-Recovery Surgery;  Location: SURGERY [facility];  Service:    • OTHER ORTHOPEDIC SURGERY Left 01/2015    rotator cuff    • OTHER  2014    Right eye cataract removel    • JUDY BY LAPAROSCOPY  2014   • COLONOSCOPY     • GYN SURGERY      ablation   • OTHER NEUROLOGICAL SURG      fusion c spine       FAMILY HISTORY:  No family history on file.    SOCIAL HISTORY:  Social History     Social History   • Marital status:      Spouse name: N/A   • Number of children: N/A   • Years of education: N/A     Occupational History   • Not on file.     Social History Main Topics   • Smoking status: Never Smoker   • Smokeless tobacco: Never Used   • Alcohol use No   • Drug use: No   • Sexual activity: Not on file     Other Topics Concern   • Not on file     Social History Narrative   • No narrative on file     ALLERGIES:  Allergies   Allergen Reactions   • Norco [Hydrocodone-Acetaminophen] Rash and Itching     Pt stated she becomes itchy when taken   • Banana    • Pcn [Penicillins] Rash     As a child. Has not received pcn since.     TOBHX  History   Smoking Status   • Never Smoker   Smokeless Tobacco   • Never Used     ALCHX  History   Alcohol Use No     DRUGHX  History   Drug Use No           MEDICATIONS:  Current Outpatient Prescriptions   Medication   • zonisamide (ZONEGRAN) 100 MG Cap   • Omega-3 Fatty Acids (FISH OIL) 1000 MG Cap  capsule   • lamotrigine (LAMICTAL) 100 MG Tab   • levetiracetam (KEPPRA) 250 MG tablet   • acetaminophen/caffeine/butalbital 300-40-50 mg (FIORICET) -40 MG Cap capsule   • levothyroxine (SYNTHROID) 88 MCG Tab   • simvastatin (ZOCOR) 40 MG Tab   • venlafaxine (EFFEXOR) 75 MG Tab   • Cholecalciferol (VITAMIN D) 2000 UNITS Cap   • Lutein 20 MG Cap   • Alum Hydroxide-Mag Trisilicate 80-14.2 MG Chew Tab   • calcium carbonate 1000 MG Chew Tab   • estradiol (ESTRACE) 1 MG Tab   • cyanocobalamin 100 MCG tablet   • norethindrone (AYGESTIN) 5 MG tablet   • omeprazole (PRILOSEC) 40 MG delayed-release capsule   • butalbital-acetaminophen-caffeine-codeine (FIORICET W/CODEINE) -18-30 MG per capsule   • zonisamide (ZONEGRAN) 25 MG capsule   • Acetaminophen 650 MG Tab   • FORMULATION R 0.25-3-14-71.9 % Ointment   • diphenhydramine-ZnAcetate (BENADRYL ITCH) 1-0.1 % Cream   • magnesium hydroxide (MILK OF MAGNESIA) 400 MG/5ML Suspension   • Cholecalciferol 2000 UNIT Cap     No current facility-administered medications for this visit.        REVIEW OF SYSTEM:    Constitutional: Denies fevers, Denies weight changes   Eyes: right eye retina bleeding  Ears/Nose/Throat/Mouth: Denies nasal congestion or sore throat   Cardiovascular: Denies chest pain or palpitations   Respiratory: Denies SOB.   Gastrointestinal/Hepatic: Denies abdominal pain, nausea, vomiting, diarrhea, constipation or GI bleeding   Genitourinary: Denies bladder dysfunction, dysuria or frequency   Musculoskeletal/Rheum: Denies joint pain and swelling   Skin/Breast: Denies rash, denies breast lumps or discharge   Neurological: meningioma, seizure, dizziness  Psychiatric: denies mood disorder   Endocrine: denies hx of diabetes or thyroid dysfunction   Heme/Oncology/Lymph Nodes: Denies enlarged lymph nodes, denies brusing or known bleeding disorder   Allergic/Immunologic: Denies hx of allergies         PHYSICAL AND NEUROLOGICAL EXMAINATIONS:  VITAL SIGNS: /56  "  Pulse 74   Temp 37.7 °C (99.9 °F)   Resp 16   Ht 1.626 m (5' 4\")   Wt 65.7 kg (144 lb 14.4 oz)   SpO2 98%   BMI 24.87 kg/m²   CURRENT WEIGHT:   BMI: Body mass index is 24.87 kg/m².  PREVIOUS WEIGHTS:  Wt Readings from Last 25 Encounters:   12/08/17 65.7 kg (144 lb 14.4 oz)   11/02/17 64.4 kg (142 lb)   07/18/17 65 kg (143 lb 3.2 oz)   03/16/17 60.8 kg (134 lb)   12/15/16 61.2 kg (135 lb)   12/14/16 59 kg (130 lb)   08/11/16 63.5 kg (140 lb)   12/20/15 63.8 kg (140 lb 11.2 oz)   11/09/15 62.6 kg (137 lb 14.4 oz)   11/01/15 76.3 kg (168 lb 3.4 oz)   09/04/15 69.9 kg (154 lb)       General appearance of patient: WDWN(+) NAD(+)    EYES  o Fundus : Papilledem(-) Exudates(-) Hemorrhage(-)  Nervous System  Orientation to time, place and person(+)  Memory normal(-) recall 3/3  Language: aphasia(-)  Knowledge: past(+) Current(+)  Attention(+)  Right eye blindess  Cranial Nerves  • Nerve 2: intact  • Nerve 3,4,6: intact  • Nerve 5 : intact  • Nerve 7: intact  • Nerve 8: intact  • Nerve 9 & 10: intact  • Nerve 11: intact  • Nerve 12: intact  Muscle Power and muscle tone: mild left hemiparesis  Sensory System: Pin sensation intact(+)  Reflexes: symmetric increased over left limbs  Cerebellar Function FNP normal   Gait : general weakness-- still able to walk with cane  Heart and Vascular  Peripheral Vasucular system : Edema (-) Swelling(-)  RHB, Breathing sound clear  abdomen bowel sound normoactive  Extremities freely moveable  Joints no contracture       NEUROIMAGING: I reviewed the MRI/CT of brain       Able to walk with cane  Tendency  Bad headache      IMPRESSION:    1. 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 Dizziness spells since March 2016 (Keppra was tapered off this Feb)-- seizures are most likely ( abnormal EEG)--- Dizziness subsided after keppra initially but came back recently  3. Hx of hypothyroidism  4. Seizure and poor memory " 2015  5. Spells of hot -cold feelings-- remains, spells of GI upset  6. Falls, Bad headache, Bad dizziness  7. Itching in the past one month-- might be due to Zonegran-- Probable allergy to Zonegran?    PLAN/RECOMMENDATIONS:    Stop Zonegran-- call us in 2 weeks  The rationale:   ( new itching for one month, dizziness and felt awful)    The major concerns--- headache daily, no energy, tiredness, hallucinations    Advise cut down on pain medicine-- including tylenol-- the less rescue medicine, the less likely to develop rebound headache  The goal is to limit all rescue medicine to less than 2# per week. The first 2 weeks of medicine withdrawal would be tough    ________________________________________________________________________    These dizziness spells are part of seizure spectrum-- not all seizures would shake or passing out     The recent EEG Aug 2017-- right side brain has active brain malfunction      Nadia has poor tolerance of keppra--higher dose than 250mg TID made her quite sleepy  Topamax made her very poor appetitie    Continue keppra 250mg three times per day  Continue Lamictal 200mg AM 100mg HS-- ( could not tolerate higher dose---patient has more headache and dizziness but reduction of Lamictal made things even worse)    This time we offer 5 days inpatient video EEG to adjust seizure medication in hospital if we could not find the best combination of seizure medication in outpatient  ________________________________________________________________________      ________________________________________________________________________    Fish Oil -- Omega 3 1000mg 3#-6# daily ( Flaxseed oil is fine too)  Try Daily Probiotic too  Vit D-3 4000 unit daily  ________________________________________________________________________    Aug 2017 EEG  ________________________________________________________________________     This EEG is consistent with active focal seizure from the right temporal  lobe     ________________________________________________________________________                         SIGNATURE:  Kenisha Martel    CC:  CURRY RodriguezNGONZALEZ    MRI of brain before and after surgery      INTERPRETATION:  This EEG denotes focal cortical dysfunction and active focal seizure   disorder coming from the right hemisphere probabley temporal lobe.       ____________________________________     MD CONSTANZA WHITMORE / MELLISSA    DD:  10/13/2016 08:20:01  DT:  10/13/2016 09:45:59      D#:  [id number]  Job#:  [id number]      EEG showed active epileptiform discharges from right hemisphere

## 2017-12-08 NOTE — PATIENT INSTRUCTIONS
IMPRESSION:    1. 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 Dizziness spells since March 2016 (Keppra was tapered off this Feb)-- seizures are most likely ( abnormal EEG)--- Dizziness subsided after keppra initially but came back recently  3. Hx of hypothyroidism  4. Seizure and poor memory 2015  5. Spells of hot -cold feelings-- remains, spells of GI upset  6. Falls, Bad headache, Bad dizziness  7. Itching in the past one month-- might be due to Zonegran-- Probable allergy to Zonegran?    PLAN/RECOMMENDATIONS:    Stop Zonegran-- call us in 2 weeks  The rationale:   ( new itching for one month, dizziness and felt awful)    The major concerns--- headache daily, no energy, tiredness, hallucinations    Advise cut down on pain medicine-- including tylenol-- the less rescue medicine, the less likely to develop rebound headache  The goal is to limit all rescue medicine to less than 2# per week. The first 2 weeks of medicine withdrawal would be tough    ________________________________________________________________________    These dizziness spells are part of seizure spectrum-- not all seizures would shake or passing out     The recent EEG Aug 2017-- right side brain has active brain malfunction      Nadia has poor tolerance of keppra--higher dose than 250mg TID made her quite sleepy  Topamax made her very poor appetitie    Continue keppra 250mg three times per day  Continue Lamictal 200mg AM 100mg HS-- ( could not tolerate higher dose---patient has more headache and dizziness but reduction of Lamictal made things even worse)    This time we offer 5 days inpatient video EEG to adjust seizure medication in hospital if we could not find the best combination of seizure medication in  outpatient  ________________________________________________________________________      ________________________________________________________________________    Fish Oil -- Omega 3 1000mg 3#-6# daily ( Flaxseed oil is fine too)  Try Daily Probiotic too  Vit D-3 4000 unit daily  ________________________________________________________________________    Aug 2017 EEG  ________________________________________________________________________     This EEG is consistent with active focal seizure from the right temporal lobe     ________________________________________________________________________

## 2017-12-10 LAB — LAMOTRIGINE SERPL-MCNC: 4 UG/ML (ref 2.5–15)

## 2017-12-18 ENCOUNTER — TELEPHONE (OUTPATIENT)
Dept: NEUROLOGY | Facility: MEDICAL CENTER | Age: 62
End: 2017-12-18

## 2017-12-18 DIAGNOSIS — R45.1 AGITATION: ICD-10-CM

## 2017-12-18 RX ORDER — QUETIAPINE FUMARATE 25 MG/1
12.5 TABLET, FILM COATED ORAL 2 TIMES DAILY
Qty: 30 TAB | Refills: 2 | Status: SHIPPED | OUTPATIENT
Start: 2017-12-18 | End: 2018-07-10 | Stop reason: SDUPTHER

## 2017-12-18 NOTE — TELEPHONE ENCOUNTER
" called stating Nadia is not doing well she has been extremely agitated and it's getting worse. She stopped Zonisamide about 8 days ago. She also has been having dizziness and HA\"S.    Please advise    Thank you  "

## 2017-12-18 NOTE — TELEPHONE ENCOUNTER
Kenisha Martel M.D.  [place], Med Ass't   Caller: Unspecified (Today,  1:34 PM)             Seroquel 12.5mg two times per day-- could calm down the agitation   Order in     Waiting for video EEG-- inpatient         Spoke to  gave him above information. I informed we can get her in for EEG inpatient January 2-5 pending auth from her insurance. He stated he would like that date. Kimberli will call and confirm once auth has been received.

## 2017-12-18 NOTE — TELEPHONE ENCOUNTER
Spoke w/ pts  and confirmed EMU admission dates of 1/2/18-1/5/18. [place] Approved 1 day and the rest with clinicals as procedure in progress.   AUTH#[id number]    Kimberli MCKENZIE

## 2017-12-27 ENCOUNTER — TELEPHONE (OUTPATIENT)
Dept: NEUROLOGY | Facility: MEDICAL CENTER | Age: 62
End: 2017-12-27

## 2017-12-27 NOTE — TELEPHONE ENCOUNTER
1. Caller Name: Edward                                         Call Back Number: [phone] (home)         Patient approves a detailed voicemail message: yes    Pt  called stating that she is refusing to take any medication, they are having to force her to take medication. She is in severe pain and the fioricet with codeine is not working at all. He is thinking of bringing her to the ER here at [facility]. He would like to know what his best course of action should be. Please advise     Thank you

## 2017-12-29 NOTE — PROGRESS NOTES
Direct admit from home. Accepted by Dr. Martel for video EED monitoring.  ADT signed & held, needs to be released upon pt arrival.  Written orders received, faxed to unit and scanned to media tab.  Pt coming by private car.

## 2018-01-02 ENCOUNTER — HOSPITAL ENCOUNTER (INPATIENT)
Facility: MEDICAL CENTER | Age: 63
LOS: 4 days | DRG: 101 | End: 2018-01-06
Attending: PSYCHIATRY & NEUROLOGY | Admitting: PSYCHIATRY & NEUROLOGY
Payer: COMMERCIAL

## 2018-01-02 DIAGNOSIS — G40.909 SEIZURE CEREBRAL (HCC): ICD-10-CM

## 2018-01-02 LAB
ANION GAP SERPL CALC-SCNC: 11 MMOL/L (ref 0–11.9)
BASOPHILS # BLD AUTO: 0.6 % (ref 0–1.8)
BASOPHILS # BLD: 0.07 K/UL (ref 0–0.12)
BUN SERPL-MCNC: 18 MG/DL (ref 8–22)
CALCIUM SERPL-MCNC: 8.9 MG/DL (ref 8.5–10.5)
CHLORIDE SERPL-SCNC: 105 MMOL/L (ref 96–112)
CO2 SERPL-SCNC: 19 MMOL/L (ref 20–33)
CREAT SERPL-MCNC: 0.81 MG/DL (ref 0.5–1.4)
CRP SERPL HS-MCNC: 0.54 MG/DL (ref 0–0.75)
EOSINOPHIL # BLD AUTO: 0.18 K/UL (ref 0–0.51)
EOSINOPHIL NFR BLD: 1.5 % (ref 0–6.9)
ERYTHROCYTE [DISTWIDTH] IN BLOOD BY AUTOMATED COUNT: 47.9 FL (ref 35.9–50)
ERYTHROCYTE [SEDIMENTATION RATE] IN BLOOD BY WESTERGREN METHOD: 16 MM/HOUR (ref 0–30)
GFR SERPL CREATININE-BSD FRML MDRD: >60 ML/MIN/1.73 M 2
GLUCOSE SERPL-MCNC: 74 MG/DL (ref 65–99)
HCT VFR BLD AUTO: 50 % (ref 37–47)
HGB BLD-MCNC: 16.6 G/DL (ref 12–16)
IMM GRANULOCYTES # BLD AUTO: 0.06 K/UL (ref 0–0.11)
IMM GRANULOCYTES NFR BLD AUTO: 0.5 % (ref 0–0.9)
LYMPHOCYTES # BLD AUTO: 3.5 K/UL (ref 1–4.8)
LYMPHOCYTES NFR BLD: 29.7 % (ref 22–41)
MCH RBC QN AUTO: 30.7 PG (ref 27–33)
MCHC RBC AUTO-ENTMCNC: 33.2 G/DL (ref 33.6–35)
MCV RBC AUTO: 92.6 FL (ref 81.4–97.8)
MONOCYTES # BLD AUTO: 0.75 K/UL (ref 0–0.85)
MONOCYTES NFR BLD AUTO: 6.4 % (ref 0–13.4)
NEUTROPHILS # BLD AUTO: 7.21 K/UL (ref 2–7.15)
NEUTROPHILS NFR BLD: 61.3 % (ref 44–72)
NRBC # BLD AUTO: 0 K/UL
NRBC BLD-RTO: 0 /100 WBC
PLATELET # BLD AUTO: 336 K/UL (ref 164–446)
PMV BLD AUTO: 9.7 FL (ref 9–12.9)
POTASSIUM SERPL-SCNC: 4 MMOL/L (ref 3.6–5.5)
RBC # BLD AUTO: 5.4 M/UL (ref 4.2–5.4)
RHEUMATOID FACT SER IA-ACNC: <10 IU/ML (ref 0–14)
SODIUM SERPL-SCNC: 135 MMOL/L (ref 135–145)
THYROPEROXIDASE AB SERPL-ACNC: <0.2 IU/ML (ref 0–9)
VALPROATE SERPL-MCNC: 78.8 UG/ML (ref 50–100)
WBC # BLD AUTO: 11.8 K/UL (ref 4.8–10.8)

## 2018-01-02 PROCEDURE — 36415 COLL VENOUS BLD VENIPUNCTURE: CPT

## 2018-01-02 PROCEDURE — 86800 THYROGLOBULIN ANTIBODY: CPT

## 2018-01-02 PROCEDURE — 86140 C-REACTIVE PROTEIN: CPT

## 2018-01-02 PROCEDURE — 86038 ANTINUCLEAR ANTIBODIES: CPT

## 2018-01-02 PROCEDURE — 80048 BASIC METABOLIC PNL TOTAL CA: CPT

## 2018-01-02 PROCEDURE — 80164 ASSAY DIPROPYLACETIC ACD TOT: CPT

## 2018-01-02 PROCEDURE — 700105 HCHG RX REV CODE 258

## 2018-01-02 PROCEDURE — 700102 HCHG RX REV CODE 250 W/ 637 OVERRIDE(OP): Performed by: PSYCHIATRY & NEUROLOGY

## 2018-01-02 PROCEDURE — 86235 NUCLEAR ANTIGEN ANTIBODY: CPT | Mod: 91

## 2018-01-02 PROCEDURE — 700105 HCHG RX REV CODE 258: Performed by: PSYCHIATRY & NEUROLOGY

## 2018-01-02 PROCEDURE — 770020 HCHG ROOM/CARE - TELE (206)

## 2018-01-02 PROCEDURE — 85025 COMPLETE CBC W/AUTO DIFF WBC: CPT

## 2018-01-02 PROCEDURE — 86147 CARDIOLIPIN ANTIBODY EA IG: CPT

## 2018-01-02 PROCEDURE — 4A10X4Z MONITORING OF CENTRAL NERVOUS ELECTRICAL ACTIVITY, EXTERNAL APPROACH: ICD-10-PCS | Performed by: PSYCHIATRY & NEUROLOGY

## 2018-01-02 PROCEDURE — 86376 MICROSOMAL ANTIBODY EACH: CPT

## 2018-01-02 PROCEDURE — 86431 RHEUMATOID FACTOR QUANT: CPT

## 2018-01-02 PROCEDURE — 95951 EEG: CPT

## 2018-01-02 PROCEDURE — A9270 NON-COVERED ITEM OR SERVICE: HCPCS | Performed by: PSYCHIATRY & NEUROLOGY

## 2018-01-02 PROCEDURE — 700111 HCHG RX REV CODE 636 W/ 250 OVERRIDE (IP): Performed by: PSYCHIATRY & NEUROLOGY

## 2018-01-02 PROCEDURE — 86225 DNA ANTIBODY NATIVE: CPT

## 2018-01-02 PROCEDURE — 85652 RBC SED RATE AUTOMATED: CPT

## 2018-01-02 RX ORDER — BUTALBITAL, ACETAMINOPHEN AND CAFFEINE 50; 325; 40 MG/1; MG/1; MG/1
1 TABLET ORAL EVERY 4 HOURS PRN
Status: DISCONTINUED | OUTPATIENT
Start: 2018-01-02 | End: 2018-01-06 | Stop reason: HOSPADM

## 2018-01-02 RX ORDER — LAMOTRIGINE 100 MG/1
100 TABLET ORAL 3 TIMES DAILY
Status: DISCONTINUED | OUTPATIENT
Start: 2018-01-02 | End: 2018-01-02

## 2018-01-02 RX ORDER — OMEPRAZOLE 20 MG/1
40 CAPSULE, DELAYED RELEASE ORAL DAILY
Status: DISCONTINUED | OUTPATIENT
Start: 2018-01-02 | End: 2018-01-06 | Stop reason: HOSPADM

## 2018-01-02 RX ORDER — LEVETIRACETAM 250 MG/1
250 TABLET ORAL 3 TIMES DAILY
Status: DISCONTINUED | OUTPATIENT
Start: 2018-01-02 | End: 2018-01-02

## 2018-01-02 RX ORDER — LEVOTHYROXINE SODIUM 88 UG/1
88 TABLET ORAL
Status: DISCONTINUED | OUTPATIENT
Start: 2018-01-02 | End: 2018-01-06 | Stop reason: HOSPADM

## 2018-01-02 RX ORDER — QUETIAPINE FUMARATE 25 MG/1
12.5 TABLET, FILM COATED ORAL 2 TIMES DAILY
Status: DISCONTINUED | OUTPATIENT
Start: 2018-01-02 | End: 2018-01-06 | Stop reason: HOSPADM

## 2018-01-02 RX ORDER — VENLAFAXINE 75 MG/1
75 TABLET ORAL 3 TIMES DAILY
Status: DISCONTINUED | OUTPATIENT
Start: 2018-01-02 | End: 2018-01-06 | Stop reason: HOSPADM

## 2018-01-02 RX ORDER — SIMVASTATIN 20 MG
40 TABLET ORAL NIGHTLY
Status: DISCONTINUED | OUTPATIENT
Start: 2018-01-02 | End: 2018-01-06 | Stop reason: HOSPADM

## 2018-01-02 RX ORDER — DIVALPROEX SODIUM 500 MG/1
500 TABLET, EXTENDED RELEASE ORAL 2 TIMES DAILY
Status: DISCONTINUED | OUTPATIENT
Start: 2018-01-02 | End: 2018-01-04

## 2018-01-02 RX ORDER — SODIUM CHLORIDE 9 MG/ML
INJECTION, SOLUTION INTRAVENOUS
Status: COMPLETED
Start: 2018-01-02 | End: 2018-01-02

## 2018-01-02 RX ORDER — ESTRADIOL 1 MG/1
1 TABLET ORAL DAILY
Status: DISCONTINUED | OUTPATIENT
Start: 2018-01-02 | End: 2018-01-06 | Stop reason: HOSPADM

## 2018-01-02 RX ADMIN — VALPROATE SODIUM 1000 MG: 100 INJECTION, SOLUTION INTRAVENOUS at 17:59

## 2018-01-02 RX ADMIN — VALPROATE SODIUM 1000 MG: 100 INJECTION, SOLUTION INTRAVENOUS at 12:12

## 2018-01-02 RX ADMIN — SODIUM CHLORIDE 500 ML: 9 INJECTION, SOLUTION INTRAVENOUS at 12:12

## 2018-01-02 RX ADMIN — LAMOTRIGINE 100 MG: 100 TABLET ORAL at 09:54

## 2018-01-02 RX ADMIN — DIVALPROEX SODIUM 500 MG: 500 TABLET, EXTENDED RELEASE ORAL at 19:45

## 2018-01-02 RX ADMIN — ESTRADIOL 1 MG: 1 TABLET ORAL at 10:01

## 2018-01-02 RX ADMIN — VENLAFAXINE 75 MG: 75 TABLET ORAL at 09:56

## 2018-01-02 RX ADMIN — VENLAFAXINE 75 MG: 75 TABLET ORAL at 19:46

## 2018-01-02 RX ADMIN — QUETIAPINE FUMARATE 12.5 MG: 25 TABLET ORAL at 09:54

## 2018-01-02 RX ADMIN — SIMVASTATIN 40 MG: 20 TABLET, FILM COATED ORAL at 19:45

## 2018-01-02 RX ADMIN — LEVETIRACETAM 250 MG: 250 TABLET ORAL at 09:56

## 2018-01-02 RX ADMIN — LEVOTHYROXINE SODIUM 88 MCG: 100 TABLET ORAL at 09:56

## 2018-01-02 RX ADMIN — OMEPRAZOLE 40 MG: 20 CAPSULE, DELAYED RELEASE ORAL at 09:56

## 2018-01-02 RX ADMIN — QUETIAPINE FUMARATE 12.5 MG: 25 TABLET ORAL at 19:45

## 2018-01-02 RX ADMIN — VENLAFAXINE 75 MG: 75 TABLET ORAL at 17:59

## 2018-01-02 ASSESSMENT — PATIENT HEALTH QUESTIONNAIRE - PHQ9
SUM OF ALL RESPONSES TO PHQ9 QUESTIONS 1 AND 2: 0
1. LITTLE INTEREST OR PLEASURE IN DOING THINGS: NOT AT ALL
2. FEELING DOWN, DEPRESSED, IRRITABLE, OR HOPELESS: NOT AT ALL
SUM OF ALL RESPONSES TO PHQ QUESTIONS 1-9: 0
1. LITTLE INTEREST OR PLEASURE IN DOING THINGS: NOT AT ALL
2. FEELING DOWN, DEPRESSED, IRRITABLE, OR HOPELESS: NOT AT ALL
SUM OF ALL RESPONSES TO PHQ9 QUESTIONS 1 AND 2: 0
SUM OF ALL RESPONSES TO PHQ QUESTIONS 1-9: 0

## 2018-01-02 ASSESSMENT — LIFESTYLE VARIABLES
ALCOHOL_USE: NO
EVER_SMOKED: NEVER

## 2018-01-02 NOTE — H&P
NEUROLOGY NOTE      CHIEF COMPLAINT:  Intractable headache, confusion, dizziness spells and seizure    PRESENT ILLNESS:   Intractable headache, confusion, dizziness spells and seizure    Dizziness spells persisted but better with keppra, lamictal and zonigran  However, developed itching with zonegran      Left hand muscle strength improved with keppra but depression sounds worsening, refused taking medication days prior to admission  July 2015 started having seizures, found to have brain tumor-- status post surgery oct 2015-- she ended in hospital for 2+ months  keppra was stopped this Feb 2016, she started having dizziness spells since March 2016  She then had extensive work up in ENT doctor      Right eye retina -- injuried     She could not tolerate seizure mediation well  Not very eager to come to hospital for video EEG in 2016    PAST MEDICAL HISTORY:  Past Medical History:   Diagnosis Date   • Anesthesia 2010    difficulty swallowing post neck surgery   • Bell's palsy     Hx   • Bronchitis     hx    • Cataract     Right eye removed. Has cataract on left eye   • Dental disorder     grinds teeth, uses mouth guard at night   • Diabetes (CMS-[facility])     was on oral meds, stopped because FS are low   • Disorder of thyroid    • Hiatus hernia syndrome    • High cholesterol    • Memory difficulties     r/t tumor   • Myopia of both eyes    • Pneumonia 2006    pneumonia and whopping cough    • Seizure (CMS-HCC)     last 9/4/15   • Snoring    • Stroke (CMS-[facility])    • Urinary incontinence        PAST SURGICAL HISTORY:  Past Surgical History:   Procedure Laterality Date   • GASTROSCOPY-ENDO  11/11/2015    Procedure: GASTROSCOPY-ENDO;  Surgeon: Charles Khan M.D.;  Location: [facility];  Service:    • PEG PLACEMENT  11/11/2015    Procedure: PEG PLACEMENT;  Surgeon: Charles Khan M.D.;  Location: [facility];  Service:    • CRANIOTOMY TUMOR Right 10/16/2015    Procedure: CRANIOTOMY TUMOR for  meningioma;  Surgeon: Carlton Faria M.D.;  Location: SURGERY [facility];  Service:    • RECOVERY  10/15/2015    Procedure: IR2-Cerebral angiogram w/poss embolization  -;  Surgeon: Ir-Recovery Surgery;  Location: SURGERY [facility];  Service:    • OTHER ORTHOPEDIC SURGERY Left 01/2015    rotator cuff    • OTHER  2014    Right eye cataract removel    • JUDY BY LAPAROSCOPY  2014   • COLONOSCOPY     • GYN SURGERY      ablation   • OTHER NEUROLOGICAL SURG      fusion c spine       FAMILY HISTORY:  No family history on file.    SOCIAL HISTORY:  Social History     Social History   • Marital status:      Spouse name: N/A   • Number of children: N/A   • Years of education: N/A     Occupational History   • Not on file.     Social History Main Topics   • Smoking status: Never Smoker   • Smokeless tobacco: Never Used   • Alcohol use No   • Drug use: No   • Sexual activity: Not on file     Other Topics Concern   • Not on file     Social History Narrative   • No narrative on file     ALLERGIES:  Allergies   Allergen Reactions   • Norco [Hydrocodone-Acetaminophen] Rash and Itching     Pt stated she becomes itchy when taken   • Banana    • Pcn [Penicillins] Rash     As a child. Has not received pcn since.     TOBHX  History   Smoking Status   • Never Smoker   Smokeless Tobacco   • Never Used     ALCHX  History   Alcohol Use No     DRUGHX  History   Drug Use No           MEDICATIONS:  No current facility-administered medications for this encounter.        REVIEW OF SYSTEM:  Constitutional: Denies fevers, Denies weight changes   Eyes: right eye retina bleeding  Ears/Nose/Throat/Mouth: Denies nasal congestion or sore throat   Cardiovascular: Denies chest pain or palpitations   Respiratory: Denies SOB.   Gastrointestinal/Hepatic: Denies abdominal pain, nausea, vomiting, diarrhea, constipation or GI bleeding   Genitourinary: Denies bladder dysfunction, dysuria or frequency    Musculoskeletal/Rheum: Denies joint pain and swelling   Skin/Breast: Denies rash, denies breast lumps or discharge   Neurological: meningioma, seizure, dizziness  Psychiatric: depression   Endocrine: denies hx of diabetes or thyroid dysfunction   Heme/Oncology/Lymph Nodes: Denies enlarged lymph nodes, denies brusing or known bleeding disorder   Allergic/Immunologic: Denies hx of allergies       PHYSICAL AND NEUROLOGICAL EXMAINATIONS:  VITAL SIGNS: /79   Pulse 60   Temp 37.4 °C (99.3 °F)   Resp 16   Wt 67.2 kg (148 lb 2.4 oz)   SpO2 96%   BMI 25.43 kg/m²   CURRENT WEIGHT:   BMI: Body mass index is 25.43 kg/m².  PREVIOUS WEIGHTS:  Wt Readings from Last 25 Encounters:   01/02/18 67.2 kg (148 lb 2.4 oz)   12/08/17 65.7 kg (144 lb 14.4 oz)   11/02/17 64.4 kg (142 lb)   07/18/17 65 kg (143 lb 3.2 oz)   03/16/17 60.8 kg (134 lb)   12/15/16 61.2 kg (135 lb)   12/14/16 59 kg (130 lb)   08/11/16 63.5 kg (140 lb)   12/20/15 63.8 kg (140 lb 11.2 oz)   11/09/15 62.6 kg (137 lb 14.4 oz)   11/01/15 76.3 kg (168 lb 3.4 oz)   09/04/15 69.9 kg (154 lb)       General appearance of patient: WDWN(+) NAD(+)    EYES  o Fundus : Papilledem(-) Exudates(-) Hemorrhage(-)  Nervous System  Orientation to time, place and person(+)  Memory normal(-)  Language: aphasia(-)  Knowledge: past(+) Current(+)  Attention(+)  Cranial Nerves  • Nerve 2: intact  • Nerve 3,4,6: intact  • Nerve 5 : intact  • Nerve 7: intact  • Nerve 8: intact  • Nerve 9 & 10: intact  • Nerve 11: intact  • Nerve 12: intact  Muscle Power and muscle tone: mild left hemiparesis  Sensory System: Pin sensation intact(+)  Reflexes: symmetric throughout  Cerebellar Function FNP normal   Gait : general weakness-- still able to walk with caneHeart and Vascular  Peripheral Vasucular system : Edema (-) Swelling(-)  RHB, Breathing sound clear  abdomen bowel sound normoactive  Extremities freely moveable  Joints no contracture       NEUROIMAGING: I reviewed the MRI/CT of brain        LAB:             IMPRESSION:     1. 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 Dizziness spells since March 2016 (Keppra was tapered off Feb 2016)-- seizures are most likely ( persistent interictal EEG)--- Dizziness subsided after keppra initially  3. Hx of hypothyroidism  4. Seizure and poor memory 2015  5. Spells of hot -cold feelings-- remains, spells of GI upset  6. Falls, Bad headache, Bad dizziness  7. Itching in the past one month-- might be due to Zonegran-- Probable allergy to Zonegran  8. Depression/confusion and refused taking seizures medication 3 days prior to this video EEG admission     PLAN/RECOMMENDATIONS:        The major concerns--- headache daily, no energy, tiredness, hallucinations       ________________________________________________________________________     These dizziness spells are part of seizure spectrum-- not all seizures would shake or passing out      The recent EEG Aug 2017-- right side brain has active brain malfunction        Nadia has poor tolerance of keppra--higher dose than 250mg TID made her quite sleepy  Topamax made her very poor appetitie  Will stop keppra this time-- since mood was bad-- depression and sad     Prior to admission, was supposed to take Lamictal 200mg AM 100mg HS-- ( could not tolerate higher dose---patient has more headache and dizziness but reduction of Lamictal made things even worse)-- and low dose keppra, the patient stopped taking all days ago     This time we offer 5 days inpatient video EEG to adjust seizure medication in hospital since we could not find the best combination of seizure medication in outpatient  ________________________________________________________________________            SIGNATURE:  Kenisha Martel

## 2018-01-02 NOTE — PROGRESS NOTES
Attention seizure pads . Neuro department will need to apply and fit seizure pads from their supply as our department does not carry this item.

## 2018-01-02 NOTE — PROGRESS NOTES
[facility] does NOT have PADS for beds.  If any further assistance needed, please call extension [phone] or place order for Ortho Technician assistance as a communication order in [place].

## 2018-01-02 NOTE — EEG PROGRESS NOTE
Intermittent epileptiform spikes over right hemisphere  The patient refused taking Keppra and Lamictal for days ( due to side effects)  Remained confused

## 2018-01-03 LAB — THYROGLOB AB SERPL-ACNC: <0.9 IU/ML (ref 0–4)

## 2018-01-03 PROCEDURE — 95951 HCHG EEG-VIDEO-24HR: CPT

## 2018-01-03 PROCEDURE — 770020 HCHG ROOM/CARE - TELE (206)

## 2018-01-03 PROCEDURE — 700102 HCHG RX REV CODE 250 W/ 637 OVERRIDE(OP): Performed by: PSYCHIATRY & NEUROLOGY

## 2018-01-03 PROCEDURE — A9270 NON-COVERED ITEM OR SERVICE: HCPCS | Performed by: PSYCHIATRY & NEUROLOGY

## 2018-01-03 RX ADMIN — SIMVASTATIN 40 MG: 20 TABLET, FILM COATED ORAL at 20:10

## 2018-01-03 RX ADMIN — NORETHINDRONE ACETATE 5 MG: 5 TABLET ORAL at 10:29

## 2018-01-03 RX ADMIN — QUETIAPINE FUMARATE 12.5 MG: 25 TABLET ORAL at 20:09

## 2018-01-03 RX ADMIN — DIVALPROEX SODIUM 500 MG: 500 TABLET, EXTENDED RELEASE ORAL at 20:10

## 2018-01-03 RX ADMIN — VENLAFAXINE 75 MG: 75 TABLET ORAL at 16:25

## 2018-01-03 RX ADMIN — ESTRADIOL 1 MG: 1 TABLET ORAL at 10:28

## 2018-01-03 RX ADMIN — OMEPRAZOLE 40 MG: 20 CAPSULE, DELAYED RELEASE ORAL at 10:28

## 2018-01-03 RX ADMIN — DIVALPROEX SODIUM 500 MG: 500 TABLET, EXTENDED RELEASE ORAL at 10:28

## 2018-01-03 RX ADMIN — VENLAFAXINE 75 MG: 75 TABLET ORAL at 20:10

## 2018-01-03 RX ADMIN — VENLAFAXINE 75 MG: 75 TABLET ORAL at 10:28

## 2018-01-03 RX ADMIN — LEVOTHYROXINE SODIUM 88 MCG: 100 TABLET ORAL at 05:32

## 2018-01-03 RX ADMIN — QUETIAPINE FUMARATE 12.5 MG: 25 TABLET ORAL at 10:28

## 2018-01-03 NOTE — CARE PLAN
Problem: Safety  Goal: Will remain free from injury  Outcome: PROGRESSING AS EXPECTED  Will continue to monitor.     Problem: Knowledge Deficit  Goal: Knowledge of disease process/condition, treatment plan, diagnostic tests, and medications will improve  Outcome: PROGRESSING SLOWER THAN EXPECTED  Will continue to monitor.

## 2018-01-03 NOTE — PROGRESS NOTES
Assumed pt care at 1845 .  Received report from day RN.  Assessment completed.  Pt confused . VEEG on at this time. Pt has no comlaints of pain at this time. Pt calls out with confused talk . Pt ambulates with 1 assist.  Bed in lowest position, locked, and bed alarm is on.   Treaded socks in place, call light within reach and staff numbers provided.  Pt needs met at this time.

## 2018-01-03 NOTE — DISCHARGE PLANNING
Medical Social Worker    Pt has no SW needs at this time. Please inform SW if DC Planning is needed.

## 2018-01-04 LAB
APTT PPP: 28.2 SEC (ref 24.7–36)
BURR CELLS/RBC NFR CSF MANUAL: 0 %
CARDIOLIPIN IGA SER IA-ACNC: 0 APL (ref 0–11)
CARDIOLIPIN IGG SER IA-ACNC: 0 GPL (ref 0–14)
CARDIOLIPIN IGM SER IA-ACNC: 20 MPL (ref 0–12)
CLARITY CSF: CLEAR
COLOR CSF: COLORLESS
COLOR SPUN CSF: COLORLESS
CYTOLOGY REG CYTOL: NORMAL
ENA SS-B IGG SER IA-ACNC: 0 AU/ML (ref 0–40)
GLUCOSE CSF-MCNC: 52 MG/DL (ref 40–80)
GRAM STN SPEC: NORMAL
INR PPP: 1.13 (ref 0.87–1.13)
NUCLEAR IGG SER QL IA: NORMAL
PROT CSF-MCNC: 91 MG/DL (ref 15–45)
PROTHROMBIN TIME: 14.2 SEC (ref 12–14.6)
RBC # CSF: 0 CELLS/UL
SIGNIFICANT IND 70042: NORMAL
SITE SITE: NORMAL
SOURCE SOURCE: NORMAL
SPECIMEN VOL CSF: 12 ML
SSA52 R0ENA AB IGG Q0420: 6 AU/ML (ref 0–40)
SSA60 R0ENA AB IGG Q0419: 0 AU/ML (ref 0–40)
TUBE # CSF: 3
TUBE # CSF: 4
VALPROATE SERPL-MCNC: 69 UG/ML (ref 50–100)
WBC # CSF: 0 CELLS/UL (ref 0–10)

## 2018-01-04 PROCEDURE — 86146 BETA-2 GLYCOPROTEIN ANTIBODY: CPT | Mod: 91

## 2018-01-04 PROCEDURE — 36415 COLL VENOUS BLD VENIPUNCTURE: CPT

## 2018-01-04 PROCEDURE — 009U3ZX DRAINAGE OF SPINAL CANAL, PERCUTANEOUS APPROACH, DIAGNOSTIC: ICD-10-PCS | Performed by: PSYCHIATRY & NEUROLOGY

## 2018-01-04 PROCEDURE — 84157 ASSAY OF PROTEIN OTHER: CPT

## 2018-01-04 PROCEDURE — 89051 BODY FLUID CELL COUNT: CPT

## 2018-01-04 PROCEDURE — 83916 OLIGOCLONAL BANDS: CPT

## 2018-01-04 PROCEDURE — 82784 ASSAY IGA/IGD/IGG/IGM EACH: CPT

## 2018-01-04 PROCEDURE — 770020 HCHG ROOM/CARE - TELE (206)

## 2018-01-04 PROCEDURE — 700111 HCHG RX REV CODE 636 W/ 250 OVERRIDE (IP): Performed by: PSYCHIATRY & NEUROLOGY

## 2018-01-04 PROCEDURE — 85730 THROMBOPLASTIN TIME PARTIAL: CPT

## 2018-01-04 PROCEDURE — 87205 SMEAR GRAM STAIN: CPT

## 2018-01-04 PROCEDURE — 83873 ASSAY OF CSF PROTEIN: CPT

## 2018-01-04 PROCEDURE — 95951 HCHG EEG-VIDEO-24HR: CPT

## 2018-01-04 PROCEDURE — 85610 PROTHROMBIN TIME: CPT

## 2018-01-04 PROCEDURE — 700105 HCHG RX REV CODE 258: Performed by: PSYCHIATRY & NEUROLOGY

## 2018-01-04 PROCEDURE — 88108 CYTOPATH CONCENTRATE TECH: CPT

## 2018-01-04 PROCEDURE — 80164 ASSAY DIPROPYLACETIC ACD TOT: CPT

## 2018-01-04 PROCEDURE — 62270 DX LMBR SPI PNXR: CPT

## 2018-01-04 PROCEDURE — 51798 US URINE CAPACITY MEASURE: CPT

## 2018-01-04 PROCEDURE — 700102 HCHG RX REV CODE 250 W/ 637 OVERRIDE(OP): Performed by: PSYCHIATRY & NEUROLOGY

## 2018-01-04 PROCEDURE — A9270 NON-COVERED ITEM OR SERVICE: HCPCS | Performed by: PSYCHIATRY & NEUROLOGY

## 2018-01-04 PROCEDURE — 87070 CULTURE OTHR SPECIMN AEROBIC: CPT

## 2018-01-04 PROCEDURE — 82945 GLUCOSE OTHER FLUID: CPT

## 2018-01-04 PROCEDURE — 86694 HERPES SIMPLEX NES ANTBDY: CPT

## 2018-01-04 PROCEDURE — 86618 LYME DISEASE ANTIBODY: CPT

## 2018-01-04 PROCEDURE — 87529 HSV DNA AMP PROBE: CPT | Mod: 91

## 2018-01-04 RX ADMIN — ESTRADIOL 1 MG: 1 TABLET ORAL at 09:37

## 2018-01-04 RX ADMIN — BUTALBITAL, ACETAMINOPHEN, AND CAFFEINE 1 TABLET: 50; 325; 40 TABLET ORAL at 05:57

## 2018-01-04 RX ADMIN — SODIUM CHLORIDE 1000 MG: 9 INJECTION, SOLUTION INTRAVENOUS at 20:29

## 2018-01-04 RX ADMIN — VALPROATE SODIUM 1000 MG: 100 INJECTION, SOLUTION INTRAVENOUS at 10:32

## 2018-01-04 RX ADMIN — VENLAFAXINE 75 MG: 75 TABLET ORAL at 09:37

## 2018-01-04 RX ADMIN — DIVALPROEX SODIUM 750 MG: 500 TABLET, EXTENDED RELEASE ORAL at 20:27

## 2018-01-04 RX ADMIN — DIVALPROEX SODIUM 500 MG: 500 TABLET, EXTENDED RELEASE ORAL at 09:37

## 2018-01-04 RX ADMIN — QUETIAPINE FUMARATE 12.5 MG: 25 TABLET ORAL at 09:37

## 2018-01-04 RX ADMIN — OMEPRAZOLE 40 MG: 20 CAPSULE, DELAYED RELEASE ORAL at 09:37

## 2018-01-04 RX ADMIN — VENLAFAXINE 75 MG: 75 TABLET ORAL at 16:28

## 2018-01-04 RX ADMIN — QUETIAPINE FUMARATE 12.5 MG: 25 TABLET ORAL at 20:31

## 2018-01-04 RX ADMIN — NORETHINDRONE ACETATE 5 MG: 5 TABLET ORAL at 09:37

## 2018-01-04 RX ADMIN — LEVOTHYROXINE SODIUM 88 MCG: 100 TABLET ORAL at 05:57

## 2018-01-04 RX ADMIN — VENLAFAXINE 75 MG: 75 TABLET ORAL at 20:32

## 2018-01-04 RX ADMIN — SIMVASTATIN 40 MG: 20 TABLET, FILM COATED ORAL at 20:31

## 2018-01-04 ASSESSMENT — ENCOUNTER SYMPTOMS
FALLS: 1
SEIZURES: 1
FEVER: 0
FOCAL WEAKNESS: 1
NAUSEA: 1
DEPRESSION: 1
ORTHOPNEA: 1

## 2018-01-04 ASSESSMENT — PAIN SCALES - GENERAL: PAINLEVEL_OUTOF10: 0

## 2018-01-04 NOTE — PROGRESS NOTES
Assumed pt care at 1850 .  Received report from day RN.  Assessment completed.  Pt continues to be confused.  Pt has no comlaints of pain at this time.  Pt continues to have problems with communication and expressing self ideas. Pt ambulates with 1 person assist to bathroom when needed, most of the time has to be reminded to go to the bathroom. Continues with VEEG monitoring, and tele monitoring/ sinus rhythm.  Skin intact but slightly red on back and bottom/ blanchable. Bed in lowest position, locked, and bed alarm is on. Treaded socks in place, call light within reach and staff numbers provided.  Pt needs met at this time.

## 2018-01-04 NOTE — PROGRESS NOTES
IMPRESSION:     1. 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 Dizziness spells since March 2016 (Keppra was tapered off Feb 2016)-- seizures are most likely ( persistent interictal EEG)--- Dizziness subsided after keppra initially  3. Hx of hypothyroidism  4. Seizure and poor memory 2015  5. Spells of hot -cold feelings-- remains, spells of GI upset  6. Falls, Bad headache, Bad dizziness  7. Itching in the past one month-- might be due to Zonegran-- Probable allergy to Zonegran  8. Depression/confusion and refused taking seizures medication 3 days prior to this video EEG admission     PLAN/RECOMMENDATIONS:      with depakote so far, the patient became more oriented and less emotional  More with us  The major concerns--- headache daily, no energy, tiredness, hallucinations   spinal tap was done       Vitals:    01/03/18 1740 01/03/18 2000 01/04/18 0000 01/04/18 0400   BP: 138/77 131/68 140/71 135/70   Pulse: 77 71 79 77   Resp: 20 16 16 16   Temp: 36.9 °C (98.4 °F) 36.9 °C (98.5 °F) 36.9 °C (98.4 °F) 36.7 °C (98.1 °F)   SpO2: 98% 97% 96% 95%   Weight:         Physical Exam   Constitutional: She is oriented to person, place, and time and well-developed, well-nourished, and in no distress.   HENT:   Head: Normocephalic.   Eyes: Pupils are equal, round, and reactive to light.   Pulmonary/Chest: Effort normal.   Abdominal: Soft.   Musculoskeletal: Normal range of motion.   Neurological: She is alert and oriented to person, place, and time.   Left hemiparesis   Skin: Skin is warm.     Review of Systems   Constitutional: Negative for fever.   Cardiovascular: Positive for orthopnea.   Gastrointestinal: Positive for nausea.   Musculoskeletal: Positive for falls.   Skin: Positive for rash.   Neurological: Positive for focal weakness and seizures.   Psychiatric/Behavioral: Positive for depression.

## 2018-01-04 NOTE — CARE PLAN
Problem: Communication  Goal: The ability to communicate needs accurately and effectively will improve  Outcome: PROGRESSING SLOWER THAN EXPECTED  Pt continues to have issues with communication, word finding issues and problems with expressing what she is feeling     Problem: Safety  Goal: Will remain free from injury  Outcome: PROGRESSING AS EXPECTED  Continues to be monitored

## 2018-01-04 NOTE — CARE PLAN
Problem: Bowel/Gastric:  Goal: Normal bowel function is maintained or improved  Outcome: PROGRESSING AS EXPECTED      Problem: Knowledge Deficit  Goal: Knowledge of disease process/condition, treatment plan, diagnostic tests, and medications will improve  Outcome: PROGRESSING AS EXPECTED

## 2018-01-04 NOTE — PROGRESS NOTES
IMPRESSION:     1. 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 Dizziness spells since March 2016 (Keppra was tapered off Feb 2016)-- seizures are most likely ( persistent interictal EEG)--- Dizziness subsided after keppra initially  3. Hx of hypothyroidism  4. Seizure and poor memory 2015  5. Spells of hot -cold feelings-- remains, spells of GI upset  6. Falls, Bad headache, Bad dizziness  7. Itching in the past one month-- might be due to Zonegran-- Probable allergy to Zonegran  8. Depression/confusion and refused taking seizures medication 3 days prior to this video EEG admission     PLAN/RECOMMENDATIONS:        The major concerns--- headache daily, no energy, tiredness, hallucinations  confusion improved with depakote  Plan to get LP because of serum inflammatory markers (+)      Results for SNEHA LITTLE (MRN [id number]) as of 1/4/2018 23:17   Ref. Range 1/2/2018 18:19   Anti-Cardiolipin Ab Igm Latest Ref Range: 0 - 12 MPL 20 (H)     Physical Exam   Constitutional: She is oriented to person, place, and time and well-developed, well-nourished, and in no distress.   HENT:   Head: Normocephalic.   Eyes: Pupils are equal, round, and reactive to light.   Pulmonary/Chest: Effort normal.   Abdominal: Soft.   Musculoskeletal: Normal range of motion.   Neurological: She is alert and oriented to person, place, and time.   Left hemiparesis   Skin: Skin is warm.      Review of Systems   Constitutional: Negative for fever.   Cardiovascular: Positive for orthopnea.   Gastrointestinal: Positive for nausea.   Musculoskeletal: Positive for falls.   Skin: Positive for rash.   Neurological: Positive for focal weakness and seizures.   Psychiatric/Behavioral: Positive for depression.

## 2018-01-05 LAB
B2 GLYCOPROT1 IGA SER-ACNC: 2 SAU (ref 0–20)
B2 GLYCOPROT1 IGG SERPL IA-ACNC: 0 SGU (ref 0–20)
B2 GLYCOPROT1 IGM SERPL IA-ACNC: 1 SMU (ref 0–20)
HSV1+2 DNA SPEC QL NAA+PROBE: NORMAL
IGG CSF-MCNC: 5.5 MG/DL (ref 0–6)
SIGNIFICANT IND 70042: NORMAL
SITE SITE: NORMAL
SOURCE SOURCE: NORMAL
VALPROATE SERPL-MCNC: 94.1 UG/ML (ref 50–100)

## 2018-01-05 PROCEDURE — A9270 NON-COVERED ITEM OR SERVICE: HCPCS | Performed by: PSYCHIATRY & NEUROLOGY

## 2018-01-05 PROCEDURE — 95951 HCHG EEG-VIDEO-24HR: CPT

## 2018-01-05 PROCEDURE — 770020 HCHG ROOM/CARE - TELE (206)

## 2018-01-05 PROCEDURE — 700102 HCHG RX REV CODE 250 W/ 637 OVERRIDE(OP): Performed by: PSYCHIATRY & NEUROLOGY

## 2018-01-05 PROCEDURE — 700105 HCHG RX REV CODE 258: Performed by: PSYCHIATRY & NEUROLOGY

## 2018-01-05 PROCEDURE — 80164 ASSAY DIPROPYLACETIC ACD TOT: CPT

## 2018-01-05 PROCEDURE — 36415 COLL VENOUS BLD VENIPUNCTURE: CPT

## 2018-01-05 PROCEDURE — 3E0234Z INTRODUCTION OF SERUM, TOXOID AND VACCINE INTO MUSCLE, PERCUTANEOUS APPROACH: ICD-10-PCS | Performed by: PSYCHIATRY & NEUROLOGY

## 2018-01-05 PROCEDURE — 700111 HCHG RX REV CODE 636 W/ 250 OVERRIDE (IP): Performed by: PSYCHIATRY & NEUROLOGY

## 2018-01-05 RX ORDER — LAMOTRIGINE 100 MG/1
100 TABLET ORAL DAILY
Status: DISCONTINUED | OUTPATIENT
Start: 2018-01-05 | End: 2018-01-06 | Stop reason: HOSPADM

## 2018-01-05 RX ADMIN — DIVALPROEX SODIUM 750 MG: 500 TABLET, EXTENDED RELEASE ORAL at 22:09

## 2018-01-05 RX ADMIN — LEVOTHYROXINE SODIUM 88 MCG: 100 TABLET ORAL at 06:19

## 2018-01-05 RX ADMIN — QUETIAPINE FUMARATE 12.5 MG: 25 TABLET ORAL at 21:00

## 2018-01-05 RX ADMIN — VENLAFAXINE 75 MG: 75 TABLET ORAL at 22:22

## 2018-01-05 RX ADMIN — ESTRADIOL 1 MG: 1 TABLET ORAL at 08:18

## 2018-01-05 RX ADMIN — VENLAFAXINE 75 MG: 75 TABLET ORAL at 14:48

## 2018-01-05 RX ADMIN — NORETHINDRONE ACETATE 5 MG: 5 TABLET ORAL at 08:18

## 2018-01-05 RX ADMIN — QUETIAPINE FUMARATE 12.5 MG: 25 TABLET ORAL at 08:17

## 2018-01-05 RX ADMIN — LAMOTRIGINE 100 MG: 100 TABLET ORAL at 11:36

## 2018-01-05 RX ADMIN — SIMVASTATIN 40 MG: 20 TABLET, FILM COATED ORAL at 21:00

## 2018-01-05 RX ADMIN — OMEPRAZOLE 40 MG: 20 CAPSULE, DELAYED RELEASE ORAL at 08:18

## 2018-01-05 RX ADMIN — SODIUM CHLORIDE 1000 MG: 9 INJECTION, SOLUTION INTRAVENOUS at 11:37

## 2018-01-05 RX ADMIN — VENLAFAXINE 75 MG: 75 TABLET ORAL at 08:17

## 2018-01-05 RX ADMIN — DIVALPROEX SODIUM 750 MG: 500 TABLET, EXTENDED RELEASE ORAL at 08:18

## 2018-01-05 ASSESSMENT — ENCOUNTER SYMPTOMS
FEVER: 0
NAUSEA: 1
DEPRESSION: 1
SEIZURES: 1
ORTHOPNEA: 1
FALLS: 1
FOCAL WEAKNESS: 1

## 2018-01-05 ASSESSMENT — PAIN SCALES - GENERAL
PAINLEVEL_OUTOF10: 0

## 2018-01-05 ASSESSMENT — LIFESTYLE VARIABLES: DO YOU DRINK ALCOHOL: NO

## 2018-01-05 NOTE — PROGRESS NOTES
Received report and assumed pt care.  A&O x2.  VSS.  EEG monitor in use.  Bed alarm and treaded socks on.  Call light within reach.  No s/s of distress or pain.

## 2018-01-05 NOTE — PROCEDURES
Lumbar Puncture    Date: 1/4/2018  Time: 600PM  Indication: intractable seizure    A time-out was completed verifying correct patient, procedure, site, positioning, and special equipment if applicable. The patient was placed in the lateral decubitus position in a semi-fetal position with help from the nursing staff. The area was cleansed and draped in usual sterile fashion.  1% lidocaine was used anesthetize the surrounding skin area. A 20-gauge 3.5-inch spinal needle was placed in the L4-L5 interspace. Clear cerebral spinal fluid was obtained  Four tubes were filled with 4 mL of CSF. These were sent for the usual tests, including 1 tube to be held for further analysis if needed.     Estimated Blood Loss: 0.1ml    The patient tolerated the procedure well and there were no complications.    CSF will be sent for the following    Cell Counts and Differential  Protein  Glucose  IgG index  Oligoclonal Bands      Kenisha Martel M.D.  NEUROLOGIST  [facility]  6:04 PM01/04/18

## 2018-01-05 NOTE — PROGRESS NOTES
Received report, assumed pt care. Pt a&o 2, VSS, assessment completed. Resting comfortably in bed with call light, bedside table in reach. No c/o at this time. Side rails up 4 (EEG monitoring). Instructed to use call light when needing to get OOB verbalized understanding. Will reinforce. Bed alarm on, bed in low position. Will continue to monitor.

## 2018-01-05 NOTE — PROGRESS NOTES
IMPRESSION:     1. 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 Dizziness spells since March 2016 (Keppra was tapered off Feb 2016)-- seizures are most likely ( persistent interictal EEG)--- Dizziness subsided after keppra initially  3. Hx of hypothyroidism  4. Seizure and poor memory 2015  5. Spells of hot -cold feelings-- remains, spells of GI upset  6. Falls, Bad headache, Bad dizziness  7. Itching in the past one month-- might be due to Zonegran-- Probable allergy to Zonegran  8. Depression/confusion and refused taking seizures medication 3 days prior to this video EEG admission     PLAN/RECOMMENDATIONS:     Depakote so far, the patient became more oriented and less emotional  More with us  Status post 2 courses of IV solumedrol-- not huge difference on EEG  Will add Lamictal 100mg QD today  Plan to discharge tomorrow AM    The major concerns--- headache daily, no energy, tiredness, hallucinations  spinal tap was done  Thursday      Vitals:    01/04/18 1900 01/05/18 0000 01/05/18 0400 01/05/18 0720   BP: 136/60 139/79 148/69 115/55   Pulse: 67 86 83 95   Resp: 16 18  16   Temp: 36.8 °C (98.2 °F) 36.5 °C (97.7 °F) 36.2 °C (97.1 °F) 36.3 °C (97.4 °F)   SpO2: 93% 94% 94% 93%   Weight:         Physical Exam   Constitutional: She is oriented to person, place, and time and well-developed, well-nourished, and in no distress.   HENT:   Head: Normocephalic.   Eyes: Pupils are equal, round, and reactive to light.   Pulmonary/Chest: Effort normal.   Abdominal: Soft.   Musculoskeletal: Normal range of motion.   Neurological: She is alert and oriented to person, place, and time.   Left hemiparesis   Skin: Skin is warm.     Review of Systems   Constitutional: Negative for fever.   Cardiovascular: Positive for orthopnea.   Gastrointestinal: Positive for nausea.   Musculoskeletal: Positive for falls.   Skin: Positive for rash.   Neurological:  Positive for focal weakness and seizures.   Psychiatric/Behavioral: Positive for depression.

## 2018-01-05 NOTE — CARE PLAN
Problem: Knowledge Deficit  Goal: Knowledge of disease process/condition, treatment plan, diagnostic tests, and medications will improve  POC: EEG monitoring     Problem: Discharge Barriers/Planning  Goal: Patient's continuum of care needs will be met  DC home tomorrow

## 2018-01-06 ENCOUNTER — TELEPHONE (OUTPATIENT)
Dept: NEUROLOGY | Facility: MEDICAL CENTER | Age: 63
End: 2018-01-06

## 2018-01-06 VITALS
OXYGEN SATURATION: 96 % | HEART RATE: 88 BPM | DIASTOLIC BLOOD PRESSURE: 66 MMHG | RESPIRATION RATE: 18 BRPM | TEMPERATURE: 98.6 F | SYSTOLIC BLOOD PRESSURE: 140 MMHG | WEIGHT: 148.15 LBS | BODY MASS INDEX: 25.43 KG/M2

## 2018-01-06 DIAGNOSIS — G40.909 SEIZURE CEREBRAL (HCC): ICD-10-CM

## 2018-01-06 LAB
B BURGDOR AB CSF IA-ACNC: 0.15 LIV
MBP CSF-MCNC: 4.63 NG/ML (ref 0–5.5)
OLIGOCLONAL BANDS CSF ELPH-IMP: NORMAL
OLIGOCLONAL BANDS CSF IEF: 0 BANDS (ref 0–1)
OLIGOCLONAL BANDS.IT SER+CSF QL: NEGATIVE

## 2018-01-06 PROCEDURE — 95951 HCHG EEG-VIDEO-24HR: CPT | Mod: 52

## 2018-01-06 PROCEDURE — 700111 HCHG RX REV CODE 636 W/ 250 OVERRIDE (IP): Performed by: PSYCHIATRY & NEUROLOGY

## 2018-01-06 PROCEDURE — 90471 IMMUNIZATION ADMIN: CPT

## 2018-01-06 PROCEDURE — A9270 NON-COVERED ITEM OR SERVICE: HCPCS | Performed by: PSYCHIATRY & NEUROLOGY

## 2018-01-06 PROCEDURE — 90686 IIV4 VACC NO PRSV 0.5 ML IM: CPT | Performed by: PSYCHIATRY & NEUROLOGY

## 2018-01-06 PROCEDURE — 90732 PPSV23 VACC 2 YRS+ SUBQ/IM: CPT | Performed by: PSYCHIATRY & NEUROLOGY

## 2018-01-06 PROCEDURE — 700102 HCHG RX REV CODE 250 W/ 637 OVERRIDE(OP): Performed by: PSYCHIATRY & NEUROLOGY

## 2018-01-06 RX ORDER — DIVALPROEX SODIUM 250 MG/1
750 TABLET, EXTENDED RELEASE ORAL 2 TIMES DAILY
Qty: 180 TAB | Refills: 3 | Status: SHIPPED | OUTPATIENT
Start: 2018-01-06 | End: 2018-01-06 | Stop reason: SDUPTHER

## 2018-01-06 RX ORDER — LAMOTRIGINE 100 MG/1
100 TABLET ORAL DAILY
Qty: 30 TAB | Refills: 3 | Status: SHIPPED | OUTPATIENT
Start: 2018-01-06 | End: 2018-02-13 | Stop reason: SDUPTHER

## 2018-01-06 RX ORDER — DIVALPROEX SODIUM 250 MG/1
750 TABLET, EXTENDED RELEASE ORAL 2 TIMES DAILY
Qty: 180 TAB | Refills: 3 | Status: SHIPPED | OUTPATIENT
Start: 2018-01-06 | End: 2018-01-18 | Stop reason: SDUPTHER

## 2018-01-06 RX ORDER — LAMOTRIGINE 100 MG/1
100 TABLET ORAL DAILY
Qty: 30 TAB | Refills: 3 | Status: SHIPPED | OUTPATIENT
Start: 2018-01-06 | End: 2018-01-06 | Stop reason: SDUPTHER

## 2018-01-06 RX ADMIN — DIVALPROEX SODIUM 750 MG: 500 TABLET, EXTENDED RELEASE ORAL at 09:02

## 2018-01-06 RX ADMIN — PNEUMOCOCCAL VACCINE POLYVALENT 25 MCG
25; 25; 25; 25; 25; 25; 25; 25; 25; 25; 25; 25; 25; 25; 25; 25; 25; 25; 25; 25; 25; 25; 25 INJECTION, SOLUTION INTRAMUSCULAR; SUBCUTANEOUS at 07:38

## 2018-01-06 RX ADMIN — INFLUENZA A VIRUS A/MICHIGAN/45/2015 X-275 (H1N1) ANTIGEN (FORMALDEHYDE INACTIVATED), INFLUENZA A VIRUS A/HONG KONG/4801/2014 X-263B (H3N2) ANTIGEN (FORMALDEHYDE INACTIVATED), INFLUENZA B VIRUS B/PHUKET/3073/2013 ANTIGEN (FORMALDEHYDE INACTIVATED), AND INFLUENZA B VIRUS B/BRISBANE/60/2008 ANTIGEN (FORMALDEHYDE INACTIVATED) 0.5 ML: 15; 15; 15; 15 INJECTION, SUSPENSION INTRAMUSCULAR at 06:13

## 2018-01-06 RX ADMIN — LAMOTRIGINE 100 MG: 100 TABLET ORAL at 08:52

## 2018-01-06 RX ADMIN — LEVOTHYROXINE SODIUM 88 MCG: 100 TABLET ORAL at 06:09

## 2018-01-06 RX ADMIN — NORETHINDRONE ACETATE 5 MG: 5 TABLET ORAL at 09:02

## 2018-01-06 RX ADMIN — QUETIAPINE FUMARATE 12.5 MG: 25 TABLET ORAL at 08:52

## 2018-01-06 RX ADMIN — VENLAFAXINE 75 MG: 75 TABLET ORAL at 08:53

## 2018-01-06 RX ADMIN — ESTRADIOL 1 MG: 1 TABLET ORAL at 08:52

## 2018-01-06 ASSESSMENT — PAIN SCALES - GENERAL
PAINLEVEL_OUTOF10: 0
PAINLEVEL_OUTOF10: 0

## 2018-01-06 NOTE — PROGRESS NOTES
Late entry:  Monitor Summary (from 1/5/2018 day shift): SR 84-95, CT .16, QRS .10, QT .32 with rare PVC per strip from monitor room

## 2018-01-06 NOTE — CARE PLAN
Problem: Safety  Goal: Will remain free from injury  Outcome: PROGRESSING AS EXPECTED  Patient educated on risks and interventions in place Patient verbalized understanding.  Bed at lowest level with rails up, call light and belongings within reach, patient calls for assistance, safety socks on, floor clear, hourly rounds in place. Alarm on, Patient remains free from injury, Will continue to monitor.    Problem: Knowledge Deficit  Goal: Knowledge of disease process/condition, treatment plan, diagnostic tests, and medications will improve  Outcome: PROGRESSING SLOWER THAN EXPECTED  Discussed POC with patient, Patient confused, will continue to evaluate

## 2018-01-06 NOTE — PROGRESS NOTES
Discharge instructions given to pt and family. All questions answered. Pt left via wheelchair with family. All belongings taken.

## 2018-01-06 NOTE — DISCHARGE SUMMARY
DISCHARGE DIAGNOSIS     1. Meningoma right temporal lobe--status post surgery 2015-- surgery was complicated with stroke with left hemiparesis and intractable seizures  2. Dizziness spells since March 2016 -- seizures are most likely ( persistent interictal EEG)--- Dizziness subsided after keppra initially  3. Hx of hypothyroidism  4. Seizure and poor memory since 2015  5. Spells of hot -cold feelings-- spells of GI upset  6. Falls, Bad headache, Bad dizziness  7. Probable allergy to Zonegran -- Itching in the past one month-  8. Emotional Disturbance--Depression/confusion and refused taking seizures medication 3 days prior to this video EEG admission     PLAN/RECOMMENDATIONS:       Hospital Course    video EEG was successfully provided  I explained to the patient, it will take us one week or so to fully review this video EEG  There were no complications during this video EEG    Follow up with me in [facility] neuroscience clinic in 2 months    During the hospital admission, Depakote was added, 2 days of IV solumedrol was tried also  Depakote so far, the patient became more oriented and less emotional  Status post 2 courses of IV solumedrol-- not huge difference on EEG  Lamictal 100mg QD today  Stop Keppra  Plan to discharge tomorrow AM          Discharge Today AM, it will take 4+ hours for them to go home  Medication Change  STOP KEPPRA ( to avoid emotional disturbances)  Add Depakote ER 750mg two times per day  Reduce Lamictal to 100mg daily  Will need blood level of Depakote and Lamictal next week-- in [facility]-- around Wednesday   If any side effects to the new medication combination-- Depakote, please contact us

## 2018-01-06 NOTE — DISCHARGE INSTRUCTIONS
Discharge Instructions      Medication Change                STOP KEPPRA ( to avoid emotional disturbances)                Add Depakote ER 750mg two times per day                Reduce Lamictal to 100mg daily                Will need blood level of Depakote and Lamictal next week-- in [facility]-- around Wednesday                If any side effects to the new medication combination-- Depakote, please contact us (Dr. Martel's office)    Discharged to home by car with relative. Discharged via wheelchair, hospital escort: Yes.  Special equipment needed: not applicable    Be sure to schedule a follow-up appointment with your primary care doctor or any specialists as instructed.     Discharge Plan:   Diet Plan: Discussed  Activity Level: Discussed  Confirmed Follow up Appointment: Patient to Call and Schedule Appointment  Confirmed Symptoms Management: Discussed  Medication Reconciliation Updated: Yes  Pneumococcal Vaccine Given - only chart on this line when given: Given (See MAR)  Influenza Vaccine Indication: Patient Refuses  Influenza Vaccine Given - only chart on this line when given: Influenza Vaccine Given (See MAR)    I understand that a diet low in cholesterol, fat, and sodium is recommended for good health. Unless I have been given specific instructions below for another diet, I accept this instruction as my diet prescription.   Other diet: as tolerated    Special Instructions: None    · Is patient discharged on Warfarin / Coumadin?   No     · Is patient Post Blood Transfusion?  No    Depression / Suicide Risk    As you are discharged from this [facility] facility, it is important to learn how to keep safe from harming yourself.    Recognize the warning signs:  · Abrupt changes in personality, positive or negative- including increase in energy   · Giving away possessions  · Change in eating patterns- significant weight changes-  positive or negative  · Change in sleeping patterns- unable to sleep or sleeping all  the time   · Unwillingness or inability to communicate  · Depression  · Unusual sadness, discouragement and loneliness  · Talk of wanting to die  · Neglect of personal appearance   · Rebelliousness- reckless behavior  · Withdrawal from people/activities they love  · Confusion- inability to concentrate     If you or a loved one observes any of these behaviors or has concerns about self-harm, here's what you can do:  · Talk about it- your feelings and reasons for harming yourself  · Remove any means that you might use to hurt yourself (examples: pills, rope, extension cords, firearm)  · Get professional help from the community (Mental Health, Substance Abuse, psychological counseling)  · Do not be alone:Call your Safe Contact- someone whom you trust who will be there for you.  · Call your local CRISIS HOTLINE 421-9623 or 312-978-8900  · Call your local Children's Mobile Crisis Response Team [place] (310) 340-0657 or www.[employer]  · Call the toll free National Suicide Prevention Hotlines   · National Suicide Prevention Lifeline 327-232-JSVS (3400)  · National Hope Line Network 800-SUICIDE (929-2188)

## 2018-01-06 NOTE — TELEPHONE ENCOUNTER
I asked the patient to get blood tests done 1/10 or later next week in her local town--   [facility] is fine    Blood Levels of Depakote + Lamictal  How could we send the lab requests to the patient?

## 2018-01-07 LAB
BACTERIA CSF CULT: NORMAL
GRAM STN SPEC: NORMAL
HSV1+2 IGM CSF-ACNC: 0.26 IV
SIGNIFICANT IND 70042: NORMAL
SITE SITE: NORMAL
SOURCE SOURCE: NORMAL

## 2018-01-07 NOTE — EEG PROGRESS NOTE
5 DAYS OF INTENSIVE VIDEO ELECTROENCEPHALOGRAM REPORT      This Video EEG was done from 1/2/2018 to 1/6/2018      INDICATION: Intractable Seizures      TECHNIQUE:  Routine electroencephalogram (EEG) was performed in accordance with the international 10-20 system. The study was reviewed in bipolar and referential montages. The recording examined the patient during wakeful and drowsy state(s).     DESCRIPTION OF THE RECORD:      One 1/2/2018 Background rhythm during awake stage shows well-organized, well-developed, average voltage 10 to 11 hertz alpha activity in the left posterior regions.  It blocks with eye opening.  There are persistent epileptiform spike-and-wave discharges over the right hemisphere one per 1-2 seconds or so.  Photic stimulation did not produce any abnormalities. Hyperventilation did not produce any abnormalities.  Stage II sleep was achieved.    One 1/3/2018 Background rhythm during awake stage shows well-organized, well-developed, average voltage 10 to 11 hertz alpha activity in the left posterior regions.  It blocks with eye opening.  There are persistent epileptiform spike-and-wave discharges over the right hemisphere one per 1-2 seconds or so. Despite increase of depakote-- the amplitude and frequency of epileptiform remained similar. Stage II sleep was achieved.    One 1/4/2018 Background rhythm during awake stage shows well-organized, well-developed, average voltage 10 to 11 hertz alpha activity in the left posterior regions.  It blocks with eye opening.  There are persistent epileptiform spike-and-wave discharges over the right hemisphere one per 1-2 seconds or so. Despite increase of depakote-- the amplitude and frequency of epileptiform remained similar. Stage II sleep was achieved.    One 1/5/2018 Background rhythm during awake stage shows well-organized, well-developed, average voltage 10 to 11 hertz alpha activity in the left posterior regions.  It blocks with eye opening.  There are  persistent epileptiform spike-and-wave discharges over the right hemisphere one per 1-2 seconds or so. Despite increase of depakote and resumption of low dose of lamictal-- the amplitude and frequency of epileptiform remained similar. Stage II sleep was achieved.    One 1/6/2018 Background rhythm during awake stage shows well-organized, well-developed, average voltage 10 to 11 hertz alpha activity in the left posterior regions.  It blocks with eye opening.  There are persistent epileptiform spike-and-wave discharges over the right hemisphere one per 1-2 seconds or so. Despite increase of depakote-- the amplitude and frequency of epileptiform remained similar.     ACTIVATION PROCEDURES:        ICTAL AND/OR INTERICTAL FINDINGS:         EKG: sampling of the EKG recording demonstrated sinus rhythm.        INTERPRETATION:      ________________________________________________________________________    This prolonged intensive video EEG from 1/2/2018 to 1/6/2018 denotes    1. Active focal seizure disorder from the right hemisphere    2. No major change on the EEG toward the end of monitoring-- remained abnormal and epileptic over the right hemisphere    3. Clinical improvements (+) -- less hallucinations, less crying, and more meaningful verbal interactions toward the end of monitoring    Keppra was stopped  Lamictal was reduced  Depakote was added during this admission      ________________________________________________________________________      Billing documentation reflecting total daily recordings:   1/2/2018 (22 hrs and 19 mins)  1/3/2018 (24 hrs)  1/4/2018 (24 hrs)  1/5/2018 (24 hrs)  1/6/2018 (2 hrs 40 mins)  ________________________________________________________________________

## 2018-01-09 ENCOUNTER — TELEPHONE (OUTPATIENT)
Dept: NEUROLOGY | Facility: MEDICAL CENTER | Age: 63
End: 2018-01-09

## 2018-01-09 NOTE — TELEPHONE ENCOUNTER
" called stating Nadia is in\" bad shape\". She cannot  Stand or help herself in any way and he is afraid she might have a stroke. I stated he should have evaluated ASAP in the ER. He wants to think about it and call his daughter and decide what to do. He stated he will call and let us know. I reiterated the need to have seen ASAP. He verbalized understanding.  "

## 2018-01-09 NOTE — TELEPHONE ENCOUNTER
"Note       called stating Nadia is in\" bad shape\". She cannot  Stand or help herself in any way and he is afraid she might have a stroke. I stated he should have evaluated ASAP in the ER. He wants to think about it and call his daughter and decide what to do. He stated he will call and let us know. I reiterated the need to have seen ASAP. He verbalized understanding.        Call  after speaking to Dr Martel.  He's requesting they hold medicine tonight and check Depakoe and lamictal level  tomorrow.   agreed and will call when labs have been drawn.   "

## 2018-01-10 ENCOUNTER — TELEPHONE (OUTPATIENT)
Dept: NEUROLOGY | Facility: MEDICAL CENTER | Age: 63
End: 2018-01-10

## 2018-01-10 DIAGNOSIS — D32.9 MENINGIOMA (HCC): ICD-10-CM

## 2018-01-10 RX ORDER — BUTALBITAL, ACETAMINOPHEN AND CAFFEINE 300; 40; 50 MG/1; MG/1; MG/1
1 CAPSULE ORAL EVERY 4 HOURS PRN
Qty: 30 CAP | Refills: 0 | OUTPATIENT
Start: 2018-01-10

## 2018-01-10 RX ORDER — BUTALBITAL, ACETAMINOPHEN, CAFFEINE AND CODEINE PHOSPHATE 50; 325; 40; 30 MG/1; MG/1; MG/1; MG/1
1 CAPSULE ORAL EVERY 12 HOURS PRN
Qty: 10 CAP | Refills: 1 | OUTPATIENT
Start: 2018-01-10

## 2018-01-10 RX ORDER — BUTALBITAL, ACETAMINOPHEN AND CAFFEINE 300; 40; 50 MG/1; MG/1; MG/1
1 CAPSULE ORAL EVERY 4 HOURS PRN
Qty: 30 CAP | Refills: 0 | Status: SHIPPED | OUTPATIENT
Start: 2018-01-10 | End: 2018-02-27 | Stop reason: SDUPTHER

## 2018-01-10 NOTE — TELEPHONE ENCOUNTER
called patient needs refill on both of her Fioricet scripts and stated she needs increase in the amount.  They are waiting for labs results and instructions for Depakote.    Please advise    Thank you

## 2018-01-12 ENCOUNTER — TELEPHONE (OUTPATIENT)
Dept: NEUROLOGY | Facility: MEDICAL CENTER | Age: 63
End: 2018-01-12

## 2018-01-12 DIAGNOSIS — G40.909 SEIZURE CEREBRAL (HCC): ICD-10-CM

## 2018-01-12 NOTE — TELEPHONE ENCOUNTER
Spoke to  patient is doing better seems is more alert. Still having hallucinations and crying. Reminded him to have lab draw done next week. He will call with any concerns.

## 2018-01-12 NOTE — TELEPHONE ENCOUNTER
Continue   Depakote ER 250mg AM 500mg HS  Lamictal 100mg AM    Recheck blood next Monday or Tuesday  Plan to up the medication based on the blood levels next week

## 2018-01-15 NOTE — PROCEDURES
DATE OF SERVICE:    This is intensive video EEG monitoring from 01/02/2018 through 01/06/2018.    5 DAYS OF INTENSIVE VIDEO ELECTROENCEPHALOGRAM REPORT        This Video EEG was done from 1/2/2018 to 1/6/2018        INDICATION: Intractable Seizures        TECHNIQUE:  Routine electroencephalogram (EEG) was performed in accordance with the international 10-20 system. The study was reviewed in bipolar and referential montages. The recording examined the patient during wakeful and drowsy state(s).      DESCRIPTION OF THE RECORD:        One 1/2/2018 Background rhythm during awake stage shows well-organized, well-developed, average voltage 10 to 11 hertz alpha activity in the left posterior regions.  It blocks with eye opening.  There are persistent epileptiform spike-and-wave discharges over the right hemisphere one per 1-2 seconds or so.  Photic stimulation did not produce any abnormalities. Hyperventilation did not produce any abnormalities.  Stage II sleep was achieved.     One 1/3/2018 Background rhythm during awake stage shows well-organized, well-developed, average voltage 10 to 11 hertz alpha activity in the left posterior regions.  It blocks with eye opening.  There are persistent epileptiform spike-and-wave discharges over the right hemisphere one per 1-2 seconds or so. Despite increase of depakote-- the amplitude and frequency of epileptiform remained similar. Stage II sleep was achieved.     One 1/4/2018 Background rhythm during awake stage shows well-organized, well-developed, average voltage 10 to 11 hertz alpha activity in the left posterior regions.  It blocks with eye opening.  There are persistent epileptiform spike-and-wave discharges over the right hemisphere one per 1-2 seconds or so. Despite increase of depakote-- the amplitude and frequency of epileptiform remained similar. Stage II sleep was achieved.     One 1/5/2018 Background rhythm during awake stage shows well-organized, well-developed,  average voltage 10 to 11 hertz alpha activity in the left posterior regions.  It blocks with eye opening.  There are persistent epileptiform spike-and-wave discharges over the right hemisphere one per 1-2 seconds or so. Despite increase of depakote and resumption of low dose of lamictal-- the amplitude and frequency of epileptiform remained similar. Stage II sleep was achieved.     One 1/6/2018 Background rhythm during awake stage shows well-organized, well-developed, average voltage 10 to 11 hertz alpha activity in the left posterior regions.  It blocks with eye opening.  There are persistent epileptiform spike-and-wave discharges over the right hemisphere one per 1-2 seconds or so. Despite increase of depakote-- the amplitude and frequency of epileptiform remained similar.      ACTIVATION PROCEDURES:          ICTAL AND/OR INTERICTAL FINDINGS:          EKG: sampling of the EKG recording demonstrated sinus rhythm.          INTERPRETATION:        ________________________________________________________________________     This prolonged intensive video EEG from 1/2/2018 to 1/6/2018 denotes     1. Active focal seizure disorder from the right hemisphere     2. No major change on the EEG toward the end of monitoring-- remained abnormal and epileptic over the right hemisphere     3. Clinical improvements (+) -- less hallucinations, less crying, and more meaningful verbal interactions toward the end of monitoring     Keppra was stopped  Lamictal was reduced  Depakote was added during this admission        ________________________________________________________________________        Billing documentation reflecting total daily recordings:   1/2/2018 (22 hrs and 19 mins)  1/3/2018 (24 hrs)  1/4/2018 (24 hrs)  1/5/2018 (24 hrs)  1/6/2018 (2 hrs 40 mins)  ________________________________________________________________________                       ____________________________________     YEN-English DONTAE, MD    YP / [facility]    DD:   01/14/2018 22:50:07  DT:  01/15/2018 00:08:52    D#:  [id number]  Job#:  [id number]

## 2018-01-17 ENCOUNTER — TELEPHONE (OUTPATIENT)
Dept: NEUROLOGY | Facility: MEDICAL CENTER | Age: 63
End: 2018-01-17

## 2018-01-18 ENCOUNTER — TELEPHONE (OUTPATIENT)
Dept: NEUROLOGY | Facility: MEDICAL CENTER | Age: 63
End: 2018-01-18

## 2018-01-18 RX ORDER — DIVALPROEX SODIUM 500 MG/1
500 TABLET, EXTENDED RELEASE ORAL 2 TIMES DAILY
Qty: 60 TAB | Refills: 3 | Status: SHIPPED | OUTPATIENT
Start: 2018-01-18 | End: 2018-04-10 | Stop reason: SDUPTHER

## 2018-01-18 NOTE — TELEPHONE ENCOUNTER
Note      TIME to up the Depakote ER        Depakote ER 500mg AM 500mg HS  Lamictal 100mg AM        Then no change unless something new  Keep us posted           Spoke to  gave above information.

## 2018-01-18 NOTE — TELEPHONE ENCOUNTER
Kenisha Martel M.D.  [place] [place], [facility]'t   Caller: Unspecified (Yesterday,  7:41 AM)             TIME to up the Depakote ER       Depakote ER 500mg AM 500mg HS   Lamictal 100mg AM       Then no change unless something new   Keep us posted     Also in telephone note      Spoke to  gave above instructions from Dr Martel. He will keep us informed.

## 2018-01-18 NOTE — TELEPHONE ENCOUNTER
TIME to up the Depakote ER      Depakote ER 500mg AM 500mg HS  Lamictal 100mg AM      Then no change unless something new  Keep us posted

## 2018-01-22 ENCOUNTER — TELEPHONE (OUTPATIENT)
Dept: NEUROLOGY | Facility: MEDICAL CENTER | Age: 63
End: 2018-01-22

## 2018-02-06 ENCOUNTER — TELEPHONE (OUTPATIENT)
Dept: NEUROLOGY | Facility: MEDICAL CENTER | Age: 63
End: 2018-02-06

## 2018-02-08 DIAGNOSIS — G40.909 SEIZURE CEREBRAL (HCC): ICD-10-CM

## 2018-02-13 ENCOUNTER — TELEMEDICINE2 (OUTPATIENT)
Dept: NEUROLOGY | Facility: MEDICAL CENTER | Age: 63
End: 2018-02-13
Payer: COMMERCIAL

## 2018-02-13 VITALS
HEART RATE: 80 BPM | WEIGHT: 145 LBS | SYSTOLIC BLOOD PRESSURE: 115 MMHG | BODY MASS INDEX: 24.75 KG/M2 | DIASTOLIC BLOOD PRESSURE: 77 MMHG | RESPIRATION RATE: 14 BRPM | OXYGEN SATURATION: 96 % | HEIGHT: 64 IN | TEMPERATURE: 98.1 F

## 2018-02-13 DIAGNOSIS — G40.909 SEIZURE CEREBRAL (HCC): ICD-10-CM

## 2018-02-13 DIAGNOSIS — D32.9 MENINGIOMA (HCC): ICD-10-CM

## 2018-02-13 PROCEDURE — 99214 OFFICE O/P EST MOD 30 MIN: CPT | Performed by: PSYCHIATRY & NEUROLOGY

## 2018-02-13 RX ORDER — LAMOTRIGINE 100 MG/1
100 TABLET ORAL DAILY
Qty: 90 TAB | Refills: 1 | Status: SHIPPED | OUTPATIENT
Start: 2018-02-13 | End: 2018-04-10 | Stop reason: SDUPTHER

## 2018-02-13 RX ORDER — LAMOTRIGINE 25 MG/1
TABLET ORAL
Qty: 90 TAB | Refills: 1 | Status: SHIPPED | OUTPATIENT
Start: 2018-02-13 | End: 2018-04-10 | Stop reason: SDUPTHER

## 2018-02-13 NOTE — PROGRESS NOTES
NEUROLOGY NOTE    Referring Physician  Brittney Wells      CHIEF COMPLAINT:    Itching for one month    Dizziness spells persisted but better with current antiseizure medication    The EEG done just days ago-- active seizure focus from the right still  Left hand muscle strength improved with keppra  July 2015 started having seizures, found to have brain tumor-- status post surgery oct 2015-- she ended in hospital for 2+ months  keppra was stopped this Feb, she started having dizziness spells since March  She then had extensive work up in ENT doctor  Chief Complaint   Patient presents with   • Follow-Up     seizures       PRESENT ILLNESS:   Dizziness spells persisted but better with current antiseizure medication      Left hand muscle strength improved with keppra  July 2015 started having seizures, found to have brain tumor-- status post surgery oct 2015-- she ended in hospital for 2+ months  keppra was stopped this Feb, she started having dizziness spells since March  She then had extensive work up in ENT doctor    She could walk with cane-- today sitting on the wheelchair    Right eye retina -- injuried    She could not tolerate seizure mediation well  Not very eager to come to hospital for video EEG    PAST MEDICAL HISTORY:  Past Medical History:   Diagnosis Date   • Anesthesia 2010    difficulty swallowing post neck surgery   • Bell's palsy     Hx   • Bronchitis     hx    • Cataract     Right eye removed. Has cataract on left eye   • Dental disorder     grinds teeth, uses mouth guard at night   • Diabetes (CMS-HCC)     was on oral meds, stopped because FS are low   • Disorder of thyroid    • Hiatus hernia syndrome    • High cholesterol    • Memory difficulties     r/t tumor   • Myopia of both eyes    • Pneumonia 2006    pneumonia and whopping cough    • Seizure (CMS-[facility])     last 9/4/15   • Snoring    • Stroke (CMS-HCC)    • Urinary incontinence        PAST SURGICAL HISTORY:  Past Surgical History:    Procedure Laterality Date   • GASTROSCOPY-ENDO  11/11/2015    Procedure: GASTROSCOPY-ENDO;  Surgeon: Charles Khan M.D.;  Location: ENDOSCOPY [facility];  Service:    • PEG PLACEMENT  11/11/2015    Procedure: PEG PLACEMENT;  Surgeon: Charles Khan M.D.;  Location: ENDOSCOPY [facility];  Service:    • CRANIOTOMY TUMOR Right 10/16/2015    Procedure: CRANIOTOMY TUMOR for meningioma;  Surgeon: Carlton Faria M.D.;  Location: SURGERY [facility];  Service:    • RECOVERY  10/15/2015    Procedure: IR2-Cerebral angiogram w/poss embolization  ;  Surgeon: Ir-Rec[facility] Surgery;  Location: SURGERY [facility];  Service:    • OTHER ORTHOPEDIC SURGERY Left 01/2015    rotator cuff    • OTHER  2014    Right eye cataract removel    • JUDY BY LAPAROSCOPY  2014   • COLONOSCOPY     • GYN SURGERY      ablation   • OTHER NEUROLOGICAL SURG      fusion c spine       FAMILY HISTORY:  History reviewed. No pertinent family history.    SOCIAL HISTORY:  Social History     Social History   • Marital status:      Spouse name: N/A   • Number of children: N/A   • Years of education: N/A     Occupational History   • Not on file.     Social History Main Topics   • Smoking status: Never Smoker   • Smokeless tobacco: Never Used   • Alcohol use No   • Drug use: No   • Sexual activity: Not on file     Other Topics Concern   • Not on file     Social History Narrative   • No narrative on file     ALLERGIES:  Allergies   Allergen Reactions   • Norco [Hydrocodone-Acetaminophen] Rash and Itching     Pt stated she becomes itchy when taken   • Banana    • Pcn [Penicillins] Rash     As a child. Has not received pcn since.     TOBHX  History   Smoking Status   • Never Smoker   Smokeless Tobacco   • Never Used     ALCHX  History   Alcohol Use No     DRUGHX  History   Drug Use No           MEDICATIONS:  Current Outpatient Prescriptions   Medication   • divalproex ER (DEPAKOTE ER) 500 MG TABLET SR 24 HR   •  "acetaminophen/caffeine/butalbital 300-40-50 mg (FIORICET) -40 MG Cap capsule   • lamotrigine (LAMICTAL) 100 MG Tab   • quetiapine (SEROQUEL) 25 MG Tab   • Omega-3 Fatty Acids (FISH OIL) 1000 MG Cap capsule   • levothyroxine (SYNTHROID) 88 MCG Tab   • simvastatin (ZOCOR) 40 MG Tab   • venlafaxine (EFFEXOR) 75 MG Tab   • Cholecalciferol (VITAMIN D) 2000 UNITS Cap   • Lutein 20 MG Cap   • calcium carbonate 1000 MG Chew Tab   • diphenhydramine-ZnAcetate (BENADRYL ITCH) 1-0.1 % Cream   • estradiol (ESTRACE) 1 MG Tab   • cyanocobalamin 100 MCG tablet   • norethindrone (AYGESTIN) 5 MG tablet   • omeprazole (PRILOSEC) 40 MG delayed-release capsule   • FORMULATION R 0.25-3-14-71.9 % Ointment   • magnesium hydroxide (MILK OF MAGNESIA) 400 MG/5ML Suspension     No current facility-administered medications for this visit.        REVIEW OF SYSTEM:    Constitutional: Denies fevers, Denies weight changes   Eyes: right eye retina bleeding  Ears/Nose/Throat/Mouth: Denies nasal congestion or sore throat   Cardiovascular: Denies chest pain or palpitations   Respiratory: Denies SOB.   Gastrointestinal/Hepatic: Denies abdominal pain, nausea, vomiting, diarrhea, constipation or GI bleeding   Genitourinary: Denies bladder dysfunction, dysuria or frequency   Musculoskeletal/Rheum: Denies joint pain and swelling   Skin/Breast: Denies rash, denies breast lumps or discharge   Neurological: meningioma, seizure, dizziness  Psychiatric: denies mood disorder   Endocrine: denies hx of diabetes or thyroid dysfunction   Heme/Oncology/Lymph Nodes: Denies enlarged lymph nodes, denies brusing or known bleeding disorder   Allergic/Immunologic: Denies hx of allergies         PHYSICAL AND NEUROLOGICAL EXMAINATIONS:  VITAL SIGNS: /77   Pulse 80   Temp 36.7 °C (98.1 °F)   Resp 14   Ht 1.626 m (5' 4.02\")   Wt 65.8 kg (145 lb)   SpO2 96%   BMI 24.88 kg/m²   CURRENT WEIGHT:   BMI: Body mass index is 24.88 kg/m².  PREVIOUS WEIGHTS:  Wt " Readings from Last 25 Encounters:   02/13/18 65.8 kg (145 lb)   01/02/18 67.2 kg (148 lb 2.4 oz)   12/08/17 65.7 kg (144 lb 14.4 oz)   11/02/17 64.4 kg (142 lb)   07/18/17 65 kg (143 lb 3.2 oz)   03/16/17 60.8 kg (134 lb)   12/15/16 61.2 kg (135 lb)   12/14/16 59 kg (130 lb)   08/11/16 63.5 kg (140 lb)   12/20/15 63.8 kg (140 lb 11.2 oz)   11/09/15 62.6 kg (137 lb 14.4 oz)   11/01/15 76.3 kg (168 lb 3.4 oz)   09/04/15 69.9 kg (154 lb)       General appearance of patient: WDWN(+) NAD(+)    EYES  o Fundus : Papilledem(-) Exudates(-) Hemorrhage(-)  Nervous System  Orientation to time, place and person(+)  Memory normal(-) recall 3/3  Language: aphasia(-)  Knowledge: past(+) Current(+)  Attention(+)  Right eye blindess  Cranial Nerves  • Nerve 2: intact  • Nerve 3,4,6: intact  • Nerve 5 : intact  • Nerve 7: intact  • Nerve 8: intact  • Nerve 9 & 10: intact  • Nerve 11: intact  • Nerve 12: intact  Muscle Power and muscle tone: mild left hemiparesis  Sensory System: Pin sensation intact(+)  Reflexes: symmetric increased over left limbs  Cerebellar Function FNP normal   Gait : general weakness-- still able to walk with cane  Heart and Vascular  Peripheral Vasucular system : Edema (-) Swelling(-)  RHB, Breathing sound clear  abdomen bowel sound normoactive  Extremities freely moveable  Joints no contracture       NEUROIMAGING: I reviewed the MRI/CT of brain       Able to walk with cane  Tendency  Bad headache      IMPRESSION:    1. 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 Dizziness spells since March 2016 (Keppra was tapered off this Feb)-- seizures are most likely ( abnormal EEG)--- Dizziness subsided after keppra initially but came back recently  3. Hx of hypothyroidism  4. Seizure and poor memory 2015  5. Spells of hot -cold feelings-- remains, spells of GI upset  6. Falls, Bad headache, Bad dizziness  7. Itching in the past one month-- might  be due to Zonegran-- Probable allergy to Zonegran?-- now subsided    PLAN/RECOMMENDATIONS:      Since discharge from the hospital, the patient has been improving  I stopped Zonegran-- itching  I stopped keppra-- bad mood  I added Depakote-- finally reached level around 75  Now slowly increasing lamictal back to decent level    Continue Depakote 500mg bid  Increase lamictal from 100mg daily to 125mg dialy     ________________________________________________________________________    These dizziness spells are part of seizure spectrum-- not all seizures would shake or passing out     The recent EEG Aug 2017-- right side brain has active brain malfunction      Nadia has poor tolerance of keppra--higher dose than 250mg TID made her quite sleepy  Topamax made her very poor appetitie  ________________________________________________________________________      ________________________________________________________________________    Fish Oil -- Omega 3 1000mg 3#-6# daily ( Flaxseed oil is fine too)  Try Daily Probiotic too  Vit D-3 4000 unit daily  ________________________________________________________________________    ________________________________________________________________________     This EEG is consistent with active focal seizure from the right temporal lobe     ________________________________________________________________________                         SIGNATURE:  Kenisha Martel    CC:  CURRY [email or web address]    MRI of brain before and after surgery      INTERPRETATION:  This EEG denotes focal cortical dysfunction and active focal seizure   disorder coming from the right hemisphere probabley temporal lobe.       ____________________________________     MD CONSTANZA WHITMORE / MELLISSA    DD:  10/13/2016 08:20:01  DT:  10/13/2016 09:45:59      D#:  [id number]  Job#:  [id number]      EEG showed active epileptiform discharges from right hemisphere

## 2018-02-13 NOTE — PATIENT INSTRUCTIONS
IMPRESSION:    1. 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 Dizziness spells since March 2016 (Keppra was tapered off this Feb)-- seizures are most likely ( abnormal EEG)--- Dizziness subsided after keppra initially but came back recently  3. Hx of hypothyroidism  4. Seizure and poor memory 2015  5. Spells of hot -cold feelings-- remains, spells of GI upset  6. Falls, Bad headache, Bad dizziness  7. Itching in the past one month-- might be due to Zonegran-- Probable allergy to Zonegran?-- now subsided    PLAN/RECOMMENDATIONS:      Since discharge from the hospital, the patient has been improving  I stopped Zonegran-- itching  I stopped keppra-- bad mood  I added Depakote-- finally reached level around 75  Now slowly increasing lamictal back to decent level    Continue Depakote 500mg bid  Increase lamictal from 100mg daily to 125mg dialy     ________________________________________________________________________    These dizziness spells are part of seizure spectrum-- not all seizures would shake or passing out     The recent EEG Aug 2017-- right side brain has active brain malfunction      Nadia has poor tolerance of keppra--higher dose than 250mg TID made her quite sleepy  Topamax made her very poor appetitie  ________________________________________________________________________

## 2018-02-27 DIAGNOSIS — D32.9 MENINGIOMA (HCC): ICD-10-CM

## 2018-02-27 RX ORDER — BUTALBITAL, ACETAMINOPHEN AND CAFFEINE 300; 40; 50 MG/1; MG/1; MG/1
1 CAPSULE ORAL EVERY 4 HOURS PRN
Qty: 30 CAP | Refills: 0 | Status: SHIPPED | OUTPATIENT
Start: 2018-02-27 | End: 2018-05-02 | Stop reason: SDUPTHER

## 2018-04-10 ENCOUNTER — TELEMEDICINE2 (OUTPATIENT)
Dept: NEUROLOGY | Facility: MEDICAL CENTER | Age: 63
End: 2018-04-10
Payer: MEDICARE

## 2018-04-10 ENCOUNTER — TELEPHONE (OUTPATIENT)
Dept: NEUROLOGY | Facility: MEDICAL CENTER | Age: 63
End: 2018-04-10

## 2018-04-10 DIAGNOSIS — G40.909 SEIZURE CEREBRAL (HCC): ICD-10-CM

## 2018-04-10 PROCEDURE — 99214 OFFICE O/P EST MOD 30 MIN: CPT | Performed by: PSYCHIATRY & NEUROLOGY

## 2018-04-10 RX ORDER — LAMOTRIGINE 100 MG/1
100 TABLET ORAL 2 TIMES DAILY
Qty: 60 TAB | Refills: 3 | Status: SHIPPED | OUTPATIENT
Start: 2018-04-10

## 2018-04-10 RX ORDER — LAMOTRIGINE 25 MG/1
50 TABLET ORAL DAILY
Qty: 30 TAB | Refills: 0 | Status: SHIPPED | OUTPATIENT
Start: 2018-04-10

## 2018-04-10 RX ORDER — DIVALPROEX SODIUM 500 MG/1
500 TABLET, EXTENDED RELEASE ORAL 2 TIMES DAILY
Qty: 180 TAB | Refills: 1 | Status: SHIPPED | OUTPATIENT
Start: 2018-04-10

## 2018-04-10 NOTE — PROGRESS NOTES
NEUROLOGY NOTE    Referring Physician  Brittney Wells      CHIEF COMPLAINT:    Itching for one month    Dizziness spells persisted but better with current antiseizure medication    The EEG done just days ago-- active seizure focus from the right still  Left hand muscle strength improved with keppra  July 2015 started having seizures, found to have brain tumor-- status post surgery oct 2015-- she ended in hospital for 2+ months  keppra was stopped this Feb, she started having dizziness spells since March  She then had extensive work up in ENT doctor  No chief complaint on file.      PRESENT ILLNESS:   Dizziness spells persisted but better with current antiseizure medication      Left hand muscle strength improved with keppra  July 2015 started having seizures, found to have brain tumor-- status post surgery oct 2015-- she ended in hospital for 2+ months  keppra was stopped this Feb, she started having dizziness spells since March  She then had extensive work up in ENT doctor    She could walk with cane-- today sitting on the wheelchair    Right eye retina -- injuried    She could not tolerate seizure mediation well  Not very eager to come to hospital for video EEG    PAST MEDICAL HISTORY:  Past Medical History:   Diagnosis Date   • Anesthesia 2010    difficulty swallowing post neck surgery   • Bell's palsy     Hx   • Bronchitis     hx    • Cataract     Right eye removed. Has cataract on left eye   • Dental disorder     grinds teeth, uses mouth guard at night   • Diabetes (CMS-HCC)     was on oral meds, stopped because FS are low   • Disorder of thyroid    • Hiatus hernia syndrome    • High cholesterol    • Memory difficulties     r/t tumor   • Myopia of both eyes    • Pneumonia 2006    pneumonia and whopping cough    • Seizure (CMS-HCC)     last 9/4/15   • Snoring    • Stroke (CMS-HCC)    • Urinary incontinence        PAST SURGICAL HISTORY:  Past Surgical History:   Procedure Laterality Date   • GASTROSCOPY-ENDO   11/11/2015    Procedure: GASTROSCOPY-ENDO;  Surgeon: Charles Khan M.D.;  Location: ENDOSCOPY [facility];  Service:    • PEG PLACEMENT  11/11/2015    Procedure: PEG PLACEMENT;  Surgeon: Charles Khan M.D.;  Location: ENDOSCOPY [facility];  Service:    • CRANIOTOMY TUMOR Right 10/16/2015    Procedure: CRANIOTOMY TUMOR for meningioma;  Surgeon: Carlton Faria M.D.;  Location: SURGERY [facility];  Service:    • RECOVERY  10/15/2015    Procedure: IR2-Cerebral angiogram w/poss embolization  -;  Surgeon: Ir-Recovery Surgery;  Location: SURGERY [facility];  Service:    • OTHER ORTHOPEDIC SURGERY Left 01/2015    rotator cuff    • OTHER  2014    Right eye cataract removel    • JUDY BY LAPAROSCOPY  2014   • COLONOSCOPY     • GYN SURGERY      ablation   • OTHER NEUROLOGICAL SURG      fusion c spine       FAMILY HISTORY:  No family history on file.    SOCIAL HISTORY:  Social History     Social History   • Marital status:      Spouse name: N/A   • Number of children: N/A   • Years of education: N/A     Occupational History   • Not on file.     Social History Main Topics   • Smoking status: Never Smoker   • Smokeless tobacco: Never Used   • Alcohol use No   • Drug use: No   • Sexual activity: Not on file     Other Topics Concern   • Not on file     Social History Narrative   • No narrative on file     ALLERGIES:  Allergies   Allergen Reactions   • Norco [Hydrocodone-Acetaminophen] Rash and Itching     Pt stated she becomes itchy when taken   • Banana    • Pcn [Penicillins] Rash     As a child. Has not received pcn since.     TOBHX  History   Smoking Status   • Never Smoker   Smokeless Tobacco   • Never Used     ALCHX  History   Alcohol Use No     DRUGHX  History   Drug Use No           MEDICATIONS:  Current Outpatient Prescriptions   Medication   • acetaminophen/caffeine/butalbital 300-40-50 mg (FIORICET) -40 MG Cap capsule   • lamoTRIgine (LAMICTAL) 25 MG Tab   •  lamoTRIgine (LAMICTAL) 100 MG Tab   • divalproex ER (DEPAKOTE ER) 500 MG TABLET SR 24 HR   • quetiapine (SEROQUEL) 25 MG Tab   • Omega-3 Fatty Acids (FISH OIL) 1000 MG Cap capsule   • levothyroxine (SYNTHROID) 88 MCG Tab   • simvastatin (ZOCOR) 40 MG Tab   • venlafaxine (EFFEXOR) 75 MG Tab   • Cholecalciferol (VITAMIN D) 2000 UNITS Cap   • Lutein 20 MG Cap   • FORMULATION R 0.25-3-14-71.9 % Ointment   • calcium carbonate 1000 MG Chew Tab   • diphenhydramine-ZnAcetate (BENADRYL ITCH) 1-0.1 % Cream   • estradiol (ESTRACE) 1 MG Tab   • cyanocobalamin 100 MCG tablet   • magnesium hydroxide (MILK OF MAGNESIA) 400 MG/5ML Suspension   • norethindrone (AYGESTIN) 5 MG tablet   • omeprazole (PRILOSEC) 40 MG delayed-release capsule     No current facility-administered medications for this visit.        REVIEW OF SYSTEM:    Constitutional: Denies fevers, Denies weight changes   Eyes: right eye retina bleeding  Ears/Nose/Throat/Mouth: Denies nasal congestion or sore throat   Cardiovascular: Denies chest pain or palpitations   Respiratory: Denies SOB.   Gastrointestinal/Hepatic: Denies abdominal pain, nausea, vomiting, diarrhea, constipation or GI bleeding   Genitourinary: Denies bladder dysfunction, dysuria or frequency   Musculoskeletal/Rheum: Denies joint pain and swelling   Skin/Breast: Denies rash, denies breast lumps or discharge   Neurological: meningioma, seizure, dizziness  Psychiatric: denies mood disorder   Endocrine: denies hx of diabetes or thyroid dysfunction   Heme/Oncology/Lymph Nodes: Denies enlarged lymph nodes, denies brusing or known bleeding disorder   Allergic/Immunologic: Denies hx of allergies         PHYSICAL AND NEUROLOGICAL EXMAINATIONS:  VITAL SIGNS: There were no vitals taken for this visit.  CURRENT WEIGHT:   BMI: There is no height or weight on file to calculate BMI.  PREVIOUS WEIGHTS:  Wt Readings from Last 25 Encounters:   02/13/18 65.8 kg (145 lb)   01/02/18 67.2 kg (148 lb 2.4 oz)   12/08/17  65.7 kg (144 lb 14.4 oz)   11/02/17 64.4 kg (142 lb)   07/18/17 65 kg (143 lb 3.2 oz)   03/16/17 60.8 kg (134 lb)   12/15/16 61.2 kg (135 lb)   12/14/16 59 kg (130 lb)   08/11/16 63.5 kg (140 lb)   12/20/15 63.8 kg (140 lb 11.2 oz)   11/09/15 62.6 kg (137 lb 14.4 oz)   11/01/15 76.3 kg (168 lb 3.4 oz)   09/04/15 69.9 kg (154 lb)       General appearance of patient: WDWN(+) NAD(+)    EYES  o Fundus : Papilledem(-) Exudates(-) Hemorrhage(-)  Nervous System  Orientation to time, place and person(+)  Memory normal(-) recall 3/3  Language: aphasia(-)  Knowledge: past(+) Current(+)  Attention(+)  Right eye blindess  Cranial Nerves  • Nerve 2: intact  • Nerve 3,4,6: intact  • Nerve 5 : intact  • Nerve 7: intact  • Nerve 8: intact  • Nerve 9 & 10: intact  • Nerve 11: intact  • Nerve 12: intact  Muscle Power and muscle tone: mild left hemiparesis  Sensory System: Pin sensation intact(+)  Reflexes: symmetric increased over left limbs  Cerebellar Function FNP normal   Gait : general weakness-- still able to walk with cane  Heart and Vascular  Peripheral Vasucular system : Edema (-) Swelling(-)  RHB, Breathing sound clear  abdomen bowel sound normoactive  Extremities freely moveable  Joints no contracture       NEUROIMAGING: I reviewed the MRI/CT of brain       Able to walk with cane  Tendency  Bad headache      IMPRESSION:    1. 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 Dizziness spells since March 2016 (Keppra was tapered off this Feb)-- seizures are most likely ( abnormal EEG)--- Dizziness subsided after keppra initially but came back recently  3. Hx of hypothyroidism  4. Seizure and poor memory 2015  5. Spells of hot -cold feelings-- remains, spells of GI upset  6. Falls, Bad headache, Bad dizziness  7. Itching in the past one month-- might be due to Zonegran-- Probable allergy to Zonegran?-- now subsided    PLAN/RECOMMENDATIONS:      Since  discharge from the hospital, the patient has been improving  Still bad days and good days    ________________________________________________________________________    Failed medication    I stopped Zonegran-- itching  I stopped keppra-- bad mood  Nadia has poor tolerance of keppra--higher dose than 250mg TID made her quite sleepy  Topamax made her very poor appetitie  ________________________________________________________________________          ________________________________________________________________________    Current medication    I added Depakote-- finally reached level around 75  Now slowly increasing lamictal to decent level-- plan to be around 8-10    Continue Depakote 500mg bid ( Recent Depakote level is around 75-- which is good target)  Increase lamictal from 100mg AM 25mg HS to ( recent Lamictal level is 5)  Lamictal 100mg AM 50mg HS for one week  Then further Lamictal to 100mg two times per day  ________________________________________________________________________          ________________________________________________________________________    These dizziness spells are part of seizure spectrum-- not all seizures would shake or passing out     The recent EEG Aug 2017-- right side brain has active brain malfunction        ________________________________________________________________________      ________________________________________________________________________    Fish Oil -- Omega 3 1000mg 3#-6# daily ( Flaxseed oil is fine too)  Try Daily Probiotic too  Vit D-3 4000 unit daily  ________________________________________________________________________    ________________________________________________________________________     This EEG is consistent with active focal seizure from the right temporal lobe     ________________________________________________________________________                         SIGNATURE:  Kenisha Martel    CC:  Brittney Wells, F.N.P.    MRI of  brain before and after surgery      INTERPRETATION:  This EEG denotes focal cortical dysfunction and active focal seizure   disorder coming from the right hemisphere probabley temporal lobe.       ____________________________________     MD CONSTANZA WHITMORE / MELLISSA    DD:  10/13/2016 08:20:01  DT:  10/13/2016 09:45:59      D#:  [id number]  Job#:  [id number]      EEG showed active epileptiform discharges from right hemisphere

## 2018-04-10 NOTE — TELEPHONE ENCOUNTER
Please fax the lamictal and depakote level to the patient    I told them to get blood tests done  2 weeks later

## 2018-04-10 NOTE — PATIENT INSTRUCTIONS
IMPRESSION:    1. 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 Dizziness spells since March 2016 (Keppra was tapered off this Feb)-- seizures are most likely ( abnormal EEG)--- Dizziness subsided after keppra initially but came back recently  3. Hx of hypothyroidism  4. Seizure and poor memory 2015  5. Spells of hot -cold feelings-- remains, spells of GI upset  6. Falls, Bad headache, Bad dizziness  7. Itching in the past one month-- might be due to Zonegran-- Probable allergy to Zonegran?-- now subsided    PLAN/RECOMMENDATIONS:      Since discharge from the hospital, the patient has been improving  Still bad days and good days    ________________________________________________________________________    Failed medication    I stopped Zonegran-- itching  I stopped keppra-- bad mood  Nadia has poor tolerance of keppra--higher dose than 250mg TID made her quite sleepy  Topamax made her very poor appetitie  ________________________________________________________________________          ________________________________________________________________________    Current medication    I added Depakote-- finally reached level around 75  Now slowly increasing lamictal to decent level-- plan to be around 8-10    Continue Depakote 500mg bid ( Recent Depakote level is around 75-- which is good target)  Increase lamictal from 100mg AM 25mg HS to ( recent Lamictal level is 5)  Lamictal 100mg AM 50mg HS for one week  Then further Lamictal to 100mg two times per day  ________________________________________________________________________          ________________________________________________________________________    These dizziness spells are part of seizure spectrum-- not all seizures would shake or passing out     The recent EEG Aug 2017-- right side brain has active brain  malfunction        ________________________________________________________________________      ________________________________________________________________________    Fish Oil -- Omega 3 1000mg 3#-6# daily ( Flaxseed oil is fine too)  Try Daily Probiotic too  Vit D-3 4000 unit daily  ________________________________________________________________________    ________________________________________________________________________

## 2018-04-10 NOTE — TELEPHONE ENCOUNTER
Please fax the lamictal and depakote level to the patient     I told them to get blood tests done  2 weeks later    Faxed and notified patient.

## 2018-05-02 DIAGNOSIS — D32.9 MENINGIOMA (HCC): ICD-10-CM

## 2018-05-02 RX ORDER — BUTALBITAL, ACETAMINOPHEN AND CAFFEINE 300; 40; 50 MG/1; MG/1; MG/1
1 CAPSULE ORAL EVERY 4 HOURS PRN
Qty: 30 CAP | Refills: 0 | Status: SHIPPED | OUTPATIENT
Start: 2018-05-02

## 2018-05-15 ENCOUNTER — TELEPHONE (OUTPATIENT)
Dept: NEUROLOGY | Facility: MEDICAL CENTER | Age: 63
End: 2018-05-15

## 2018-05-17 NOTE — TELEPHONE ENCOUNTER
Spoke to  he stated she's not able to walk really well, but is doing PT 2 x's q week. She also has been  hallucinating lately. He see's no difference since the increase of Lamictal.

## 2018-05-18 DIAGNOSIS — G40.909 SEIZURE CEREBRAL (HCC): ICD-10-CM

## 2018-05-18 RX ORDER — PHENOBARBITAL 30 MG/1
30 TABLET ORAL
Qty: 90 TAB | Refills: 0 | Status: SHIPPED | OUTPATIENT
Start: 2018-05-18 | End: 2018-08-16

## 2018-05-18 NOTE — TELEPHONE ENCOUNTER
Kenisha Martel M.D.  [facility], [facility]   Caller: Unspecified (3 days ago,  2:52 PM)             OK     The last resort   On top of depakote + lamictal   Phenobarbital 30mg before sleep   Order in epic   Keep us posted     Tried to call patient with above information. The # is not excepting calls at this time.

## 2018-05-22 ENCOUNTER — TELEPHONE (OUTPATIENT)
Dept: NEUROLOGY | Facility: MEDICAL CENTER | Age: 63
End: 2018-05-22

## 2018-05-22 NOTE — TELEPHONE ENCOUNTER
Spoke to  started phenobarbital it does make her sleep a lot. I told him to call us in a week with update or with any concerns.    Please see media for  Lamotrigine level.

## 2018-05-30 ENCOUNTER — TELEPHONE (OUTPATIENT)
Dept: NEUROLOGY | Facility: MEDICAL CENTER | Age: 63
End: 2018-05-30

## 2018-05-30 NOTE — TELEPHONE ENCOUNTER
called patient not doing well, she cannot stand or sit up be herself anymore.  Started when she started the Phenobarbital.  She is having hallucinations and staring, she is not doing PT and is total care. He has not gotten latest lab results on her levels. He feels that she might need nursing home care if she can't get back to the way she was.        Please advise

## 2018-05-31 NOTE — TELEPHONE ENCOUNTER
Kenisha Martel M.D.  [facility] [place], [facility]   Caller: Unspecified (Yesterday,  1:25 PM)             Stop phenobarbital then   Therapeutic trial vit B1 500mg daily X 2 weeks and see   (Vit B1- Thiamine) over the counter     Spoke to  gave above information. Informed him that I put patient on schedule for 6/12/2018 11:40 telemed. I told him if he wants to bring her into office sooner to call and I would work  into schedule. He verbalized understanding.

## 2018-06-12 ENCOUNTER — TELEMEDICINE2 (OUTPATIENT)
Dept: NEUROLOGY | Facility: MEDICAL CENTER | Age: 63
End: 2018-06-12
Payer: MEDICARE

## 2018-06-12 ENCOUNTER — TELEPHONE (OUTPATIENT)
Dept: NEUROLOGY | Facility: MEDICAL CENTER | Age: 63
End: 2018-06-12

## 2018-06-12 VITALS
TEMPERATURE: 97.2 F | BODY MASS INDEX: 25.61 KG/M2 | SYSTOLIC BLOOD PRESSURE: 118 MMHG | HEIGHT: 64 IN | HEART RATE: 77 BPM | OXYGEN SATURATION: 96 % | WEIGHT: 150 LBS | RESPIRATION RATE: 16 BRPM | DIASTOLIC BLOOD PRESSURE: 74 MMHG

## 2018-06-12 DIAGNOSIS — G40.909 SEIZURE CEREBRAL (HCC): ICD-10-CM

## 2018-06-12 DIAGNOSIS — D32.9 MENINGIOMA (HCC): ICD-10-CM

## 2018-06-12 PROCEDURE — 99214 OFFICE O/P EST MOD 30 MIN: CPT | Performed by: PSYCHIATRY & NEUROLOGY

## 2018-06-12 NOTE — PROGRESS NOTES
NEUROLOGY NOTE    Referring Physician  Brittney Wells      CHIEF COMPLAINT:    Itching for one month    Dizziness spells persisted but better with current antiseizure medication    The EEG done just days ago-- active seizure focus from the right still  Left hand muscle strength improved with keppra  July 2015 started having seizures, found to have brain tumor-- status post surgery oct 2015-- she ended in hospital for 2+ months  keppra was stopped this Feb, she started having dizziness spells since March  She then had extensive work up in ENT doctor  Chief Complaint   Patient presents with   • Follow-Up     dizzy spells       PRESENT ILLNESS:   Dizziness spells persisted but better with current antiseizure medication      Left hand muscle strength improved with keppra  July 2015 started having seizures, found to have brain tumor-- status post surgery oct 2015-- she ended in hospital for 2+ months  keppra was stopped this Feb, she started having dizziness spells since March  She then had extensive work up in ENT doctor    She could walk with cane-- today sitting on the wheelchair    Right eye retina -- injuried    She could not tolerate seizure mediation well  Not very eager to come to hospital for video EEG    PAST MEDICAL HISTORY:  Past Medical History:   Diagnosis Date   • Anesthesia 2010    difficulty swallowing post neck surgery   • Bell's palsy     Hx   • Bronchitis     hx    • Cataract     Right eye removed. Has cataract on left eye   • Dental disorder     grinds teeth, uses mouth guard at night   • Diabetes (HCC)     was on oral meds, stopped because FS are low   • Disorder of thyroid    • Hiatus hernia syndrome    • High cholesterol    • Memory difficulties     r/t tumor   • Myopia of both eyes    • Pneumonia 2006    pneumonia and whopping cough    • Seizure (HCC)     last 9/4/15   • Snoring    • Stroke ([facility])    • Urinary incontinence        PAST SURGICAL HISTORY:  Past Surgical History:   Procedure  Laterality Date   • GASTROSCOPY-ENDO  11/11/2015    Procedure: GASTROSCOPY-ENDO;  Surgeon: Charles Khan M.D.;  Location: ENDOSCOPY [facility];  Service:    • PEG PLACEMENT  11/11/2015    Procedure: PEG PLACEMENT;  Surgeon: Charles Khan M.D.;  Location: ENDOSCOPY [facility];  Service:    • CRANIOTOMY TUMOR Right 10/16/2015    Procedure: CRANIOTOMY TUMOR for meningioma;  Surgeon: Carlton Faria M.D.;  [place] [facility] [place]  Service:    • RECOVERY  10/15/2015    Procedure: IR2-Cerebral angiogram w/poss embolization  -;  Surgeon: Ir[facility]Rec[facility];  [place] [facility] [place]  Service:    • OTHER ORTHOPEDIC SURGERY Left 01/2015    rotator cuff    • OTHER  2014    Right eye cataract removel    • JUDY BY LAPAROSCOPY  2014   • COLONOSCOPY     • GYN SURGERY      ablation   • OTHER NEUROLOGICAL SURG      fusion c spine       FAMILY HISTORY:  History reviewed. No pertinent family history.    SOCIAL HISTORY:  Social History     Social History   • Marital status:      Spouse name: N/A   • Number of children: N/A   • Years of education: N/A     Occupational History   • Not on file.     Social History Main Topics   • Smoking status: Never Smoker   • Smokeless tobacco: Never Used   • Alcohol use No   • Drug use: No   • Sexual activity: Not on file     Other Topics Concern   • Not on file     Social History Narrative   • No narrative on file     ALLERGIES:  Allergies   Allergen Reactions   • Norco [Hydrocodone-Acetaminophen] Rash and Itching     Pt stated she becomes itchy when taken   • Pcn [Penicillins] Rash     As a child. Has not received pcn since.     TOBHX  History   Smoking Status   • Never Smoker   Smokeless Tobacco   • Never Used     ALCHX  History   Alcohol Use No     DRUGHX  History   Drug Use No           MEDICATIONS:  Current Outpatient Prescriptions   Medication   • phenobarbital 30 MG Tab   • acetaminophen/caffeine/butalbital 300-40-50 mg  "(FIORICET) -40 MG Cap capsule   • lamoTRIgine (LAMICTAL) 25 MG Tab   • lamoTRIgine (LAMICTAL) 100 MG Tab   • divalproex ER (DEPAKOTE ER) 500 MG TABLET SR 24 HR   • quetiapine (SEROQUEL) 25 MG Tab   • Omega-3 Fatty Acids (FISH OIL) 1000 MG Cap capsule   • levothyroxine (SYNTHROID) 88 MCG Tab   • simvastatin (ZOCOR) 40 MG Tab   • venlafaxine (EFFEXOR) 75 MG Tab   • Cholecalciferol (VITAMIN D) 2000 UNITS Cap   • Lutein 20 MG Cap   • FORMULATION R 0.25-3-14-71.9 % Ointment   • calcium carbonate 1000 MG Chew Tab   • diphenhydramine-ZnAcetate (BENADRYL ITCH) 1-0.1 % Cream   • estradiol (ESTRACE) 1 MG Tab   • cyanocobalamin 100 MCG tablet   • magnesium hydroxide (MILK OF MAGNESIA) 400 MG/5ML Suspension   • norethindrone (AYGESTIN) 5 MG tablet   • omeprazole (PRILOSEC) 40 MG delayed-release capsule     No current facility-administered medications for this visit.        REVIEW OF SYSTEM:    Constitutional: Denies fevers, Denies weight changes   Eyes: right eye retina bleeding  Ears/Nose/Throat/Mouth: Denies nasal congestion or sore throat   Cardiovascular: Denies chest pain or palpitations   Respiratory: Denies SOB.   Gastrointestinal/Hepatic: Denies abdominal pain, nausea, vomiting, diarrhea, constipation or GI bleeding   Genitourinary: Denies bladder dysfunction, dysuria or frequency   Musculoskeletal/Rheum: Denies joint pain and swelling   Skin/Breast: Denies rash, denies breast lumps or discharge   Neurological: meningioma, seizure, dizziness  Psychiatric: denies mood disorder   Endocrine: denies hx of diabetes or thyroid dysfunction   Heme/Oncology/Lymph Nodes: Denies enlarged lymph nodes, denies brusing or known bleeding disorder   Allergic/Immunologic: Denies hx of allergies         PHYSICAL AND NEUROLOGICAL EXMAINATIONS:  VITAL SIGNS: /74   Pulse 77   Temp 36.2 °C (97.2 °F)   Resp 16   Ht 1.626 m (5' 4\")   Wt 68 kg (150 lb)   SpO2 96%   BMI 25.75 kg/m²   CURRENT WEIGHT:   BMI: Body mass index is " 25.75 kg/m².  PREVIOUS WEIGHTS:  Wt Readings from Last 25 Encounters:   06/12/18 68 kg (150 lb)   02/13/18 65.8 kg (145 lb)   01/02/18 67.2 kg (148 lb 2.4 oz)   12/08/17 65.7 kg (144 lb 14.4 oz)   11/02/17 64.4 kg (142 lb)   07/18/17 65 kg (143 lb 3.2 oz)   03/16/17 60.8 kg (134 lb)   12/15/16 61.2 kg (135 lb)   12/14/16 59 kg (130 lb)   08/11/16 63.5 kg (140 lb)   12/20/15 63.8 kg (140 lb 11.2 oz)   11/09/15 62.6 kg (137 lb 14.4 oz)   11/01/15 76.3 kg (168 lb 3.4 oz)   09/04/15 69.9 kg (154 lb)       General appearance of patient: WDWN(+) NAD(+)    EYES  o Fundus : Papilledem(-) Exudates(-) Hemorrhage(-)  Nervous System  Orientation to time, place and person(+)  Memory normal(-) recall 3/3  Language: aphasia(-)  Knowledge: past(+) Current(+)  Attention(+)  Right eye blindess  Cranial Nerves  • Nerve 2: intact  • Nerve 3,4,6: intact  • Nerve 5 : intact  • Nerve 7: intact  • Nerve 8: intact  • Nerve 9 & 10: intact  • Nerve 11: intact  • Nerve 12: intact  Muscle Power and muscle tone: mild left hemiparesis  Sensory System: Pin sensation intact(+)  Reflexes: symmetric increased over left limbs  Cerebellar Function FNP normal   Gait : general weakness-- still able to walk with cane  Heart and Vascular  Peripheral Vasucular system : Edema (-) Swelling(-)  RHB, Breathing sound clear  abdomen bowel sound normoactive  Extremities freely moveable  Joints no contracture       NEUROIMAGING: I reviewed the MRI/CT of brain       Able to walk with cane  Tendency  Bad headache  _______________________________________________________________________    Failed medication    Zonegran-- itching  keppra-- bad mood-- keppra--higher dose than 250mg TID made her quite sleepy  Phenobarbital -very sleepy  Topamax made her very poor appetitie  ________________________________________________________________________          IMPRESSION:    1. Meningoma right temporal lobe--status post surgery-- surgery was complicated with stroke with mild  left hemiparesis  2. Dizziness spells since March 2016 (Keppra was tapered off this Feb)-- seizures are most likely ( abnormal EEG)--- Dizziness subsided after keppra initially but came back recently  3. Hx of hypothyroidism  4. Seizure and poor memory 2015  5. Spells of hot -cold feelings-- remains, spells of GI upset  6. Falls, Bad headache, Bad dizziness  7. Relocated to nursing home in the last week or so    PLAN/RECOMMENDATIONS:      Since discharge from the hospital, the patient has been improving  Still bad days and good days          ________________________________________________________________________    Medication    Continue Depakote and Lamictal      Continue Thiamine( VitB1) 500mg daily PLUS the following B3 and Magnesium  500mg Niacinamide (B3) , 500mg (thiamine) B1 and 2000mg Magnesium L-Threonate    The patient's general physical conditions are not good enough for us to push the seizure medication ( although her seizures probably remains)  In the meantime, we will focus on the nutriontioal status  ________________________________________________________________________    The following may also help  _______________________________________________________________________    Fish Oil -- Omega 3 1000mg 3#-6# daily ( Flaxseed oil is fine too)  Try Daily Probiotic too  Vit D-3 4000 unit daily  ________________________________________________________________________    ________________________________________________________________________     This EEG is consistent with active focal seizure from the right temporal lobe     ________________________________________________________________________                         SIGNATURE:  Kenisha Martel    CC:  CURRY RodriguezN.P.    MRI of brain before and after surgery      INTERPRETATION:  This EEG denotes focal cortical dysfunction and active focal seizure   disorder coming from the right hemisphere probabley temporal lobe.        ____________________________________     MD CONSTANZA WHITMORE    DD:  10/13/2016 08:20:01  DT:  10/13/2016 09:45:59      D#:  [id number]  Job#:  [id number]      EEG showed active epileptiform discharges from right hemisphere

## 2018-06-12 NOTE — PATIENT INSTRUCTIONS
________________________________________________________________________    Failed medication    Zonegran-- itching  keppra-- bad mood-- keppra--higher dose than 250mg TID made her quite sleepy  Phenobarbital -very sleepy  Topamax made her very poor appetitie  ________________________________________________________________________        IMPRESSION:    1. 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 Dizziness spells since March 2016 (Keppra was tapered off this Feb)-- seizures are most likely ( abnormal EEG)--- Dizziness subsided after keppra initially but came back recently  3. Hx of hypothyroidism  4. Seizure and poor memory 2015  5. Spells of hot -cold feelings-- remains, spells of GI upset  6. Falls, Bad headache, Bad dizziness  7. Relocated to nursing home in the last week or so    PLAN/RECOMMENDATIONS:      Since discharge from the hospital, the patient has been improving  Still bad days and good days          ________________________________________________________________________    Medication    Continue Depakote and Lamictal      Continue Thiamine( VitB1) 500mg daily PLUS the following B3 and Magnesium  500mg Niacinamide (B3) , 500mg (thiamine) B1 and 2000mg Magnesium L-Threonate    The patient's general physical conditions are not good enough for us to push the seizure medication ( although her seizures probably remains)  In the meantime, we will focus on the nutriontioal status  ________________________________________________________________________    The following may also help  _______________________________________________________________________    Fish Oil -- Omega 3 1000mg 3#-6# daily ( Flaxseed oil is fine too)  Try Daily Probiotic too  Vit D-3 4000 unit daily  ________________________________________________________________________    ________________________________________________________________________     This EEG  is consistent with active focal seizure from the right temporal lobe     ________________________________________________________________________

## 2018-06-12 NOTE — TELEPHONE ENCOUNTER
Patient is now in a facility called [facility].   Fax: [phone]  Attn: Cindi Evans RN..

## 2018-06-27 ENCOUNTER — TELEPHONE (OUTPATIENT)
Dept: NEUROLOGY | Facility: MEDICAL CENTER | Age: 63
End: 2018-06-27

## 2018-06-27 NOTE — TELEPHONE ENCOUNTER
Almaz Mills, Med Ass't             Danielle Cramer from the [facility] called for you today they are needing a call back regard Nadia's Magnesium and B1.     She can be reached at [phone] she is in [address].     Thank you,     Almaz      Spoke to Danielle faxed last progress note.

## 2018-07-05 ENCOUNTER — TELEPHONE (OUTPATIENT)
Dept: NEUROLOGY | Facility: MEDICAL CENTER | Age: 63
End: 2018-07-05

## 2018-07-05 NOTE — TELEPHONE ENCOUNTER
asking state magnesium dosage to high for patient, you want to lower? Or prescribe something else? Also is there another medication similar to seroquel you can prescribe that the providers at [place] might be comfortable giving patient? Please advise.

## 2018-07-05 NOTE — TELEPHONE ENCOUNTER
" call stated patient is in a nursing home in [place] and that the doctors there do not want to give patient the medication Seroquel 25 mg tab which is a medication she had already been taking, the doctors there stated to pt's  the medication is not a psychotic drug and dr's are \" hestitant \" to give to pt.  statespt needs the rx Seriquel for her anxiety, he is asking \" does dr duarte feel pt needs to take it \"?  states the magnesium is \"awfully high\". Patient is having some episodes of shacking and having headaches.  would like Dr Duarte to be the prescriber to pt's medications because he knows patient case better since she is his patient. Please advise, thank you.  "

## 2018-07-09 ENCOUNTER — TELEPHONE (OUTPATIENT)
Dept: NEUROLOGY | Facility: MEDICAL CENTER | Age: 63
End: 2018-07-09

## 2018-07-09 NOTE — TELEPHONE ENCOUNTER
Patient's  called nursing facility does not want to give Seroquel. He states she is having a lot of anxiety. Patient told him she was having a lot of headaches and shooting electricity pain down her right arm. She has not been eating.    He would like you to order Seroquel and wants you still be her specialist.    Please advise    Thank you

## 2018-07-09 NOTE — TELEPHONE ENCOUNTER
quetiapine (SEROQUEL) 25 MG Tab 30 Tab 2/2 12/18/2017     Sig - Route: Take 0.5 Tabs by mouth 2 times a day. - Oral    E-Prescribing Status: Receipt confirmed by pharmacy      Could you please print a new script of this medication so that we can send to her nursing home?     Please advise.

## 2018-07-10 RX ORDER — QUETIAPINE FUMARATE 25 MG/1
12.5 TABLET, FILM COATED ORAL 2 TIMES DAILY
Qty: 90 TAB | Refills: 1 | Status: SHIPPED | OUTPATIENT
Start: 2018-07-10